# Patient Record
Sex: MALE | Race: WHITE | Employment: FULL TIME | URBAN - METROPOLITAN AREA
[De-identification: names, ages, dates, MRNs, and addresses within clinical notes are randomized per-mention and may not be internally consistent; named-entity substitution may affect disease eponyms.]

---

## 2017-06-16 ENCOUNTER — TELEPHONE (OUTPATIENT)
Dept: CARDIOLOGY CLINIC | Age: 62
End: 2017-06-16

## 2017-06-16 NOTE — TELEPHONE ENCOUNTER
[facility] from [facility] for [facility] biopsy on 7/12/17. Last seen 3/2016 and did not show for last appointment. Needs appointment with Dr Jamie Thakur. Verified patient with two types of identifiers. Made appt to see Dr Jamie Thakur for clearance. Patient verbalizes understanding. And will call with any questions or concerns.

## 2017-07-06 ENCOUNTER — OFFICE VISIT (OUTPATIENT)
Dept: CARDIOLOGY CLINIC | Age: 62
End: 2017-07-06

## 2017-07-06 VITALS
DIASTOLIC BLOOD PRESSURE: 70 MMHG | HEART RATE: 60 BPM | SYSTOLIC BLOOD PRESSURE: 96 MMHG | HEIGHT: 77 IN | WEIGHT: 217 LBS | BODY MASS INDEX: 25.62 KG/M2

## 2017-07-06 DIAGNOSIS — I48.92 PAROXYSMAL ATRIAL FLUTTER (HCC): ICD-10-CM

## 2017-07-06 DIAGNOSIS — Z01.810 PREOP CARDIOVASCULAR EXAM: Primary | ICD-10-CM

## 2017-07-06 DIAGNOSIS — I48.19 PERSISTENT ATRIAL FIBRILLATION (HCC): ICD-10-CM

## 2017-07-06 NOTE — PROGRESS NOTES
Cardiac Electrophysiology Office Note     Subjective:      Lupe Baxter is a 64 y.o.  male patient whom I had seen at [address] [place] [address] and cardioverted his atrial fibrillation in 10/2011. At the time he had small bowel surgery. In the past he has sinus bradycardia at rest and with exercise so he does not want to take medications every day   Reports history of stroke related to an air embolism. Was admitted at [address] [place] [address] for bowel obstruction at that time. Had a central line removed and within 2 minutes CP/SOB sx-found to be an air embolism. Had a 13 day hospital stay with this. Had left sided hemiparesis which resolved within a few days. Denies residual effects    He is here today for cardiac clearance prior to his prostate biopsy next week with Jovanni Valdovinos at the [facility] [place]. He says he has been doing well. He denies palpitations, irregular heart beat, SOB, chest pain or LE edema. He only takes metoprolol and flecainide prn (pill in the pocket) and he has not have to take this. He takes aspirin. He was not aware he was in atrial fibrillation today.      Past Medical History:   Diagnosis Date    Atrial fibrillation with rapid ventricular response ([address] 10/15/2011    [facility] by Dr. Mellisa Campbell 2011    DVT (deep venous thrombosis) ([address]     duirng 4/08 around time of bowel surgery;  and again in 2011 after bowel surgery;  coagulopathy workup was OK    Embolism ([address] 4/1/2008    s/p small bowel surgery, at time of catheter removal reduced EF immediately, improved to normal on subsequent echos    PAF (paroxysmal atrial fibrillation) ([address]     present 15 years, had [facility] in 2011 during admission for SBO    Stroke ([address] 4/5/2008    air embolism after central line removal; s/p small bowel surgery    Ulcerative colitis     s/p total colectomy     Past Surgical History:   Procedure Laterality Date    CARDIOVERSION EXTERNAL  10/17/2011         HX COLECTOMY      total colectomy with IPAA for UC    HX LYSIS OF ADHESIONS      HX OTHER SURGICAL      Echo 10/11 - LVEF 55-60%, mild LAE, mild MR    HX SMALL BOWEL RESECTION       Allergies   Allergen Reactions    Cephalosporins Other (comments)     Intrahepatic cholestasis with cephalosporin, bilirubin valentina as high as 6. NOT AN ALLERGY, but rather an adverse reaction. He could likely tolerate a short course if the risk < benefit. Current Outpatient Prescriptions   Medication Sig    flecainide (TAMBOCOR) 100 mg tablet Take 3 tablets as needed 30 minutes after a dose of metoprolol if AFIB does not stop  Indications: PAROXYSMAL ATRIAL FLUTTER    cholecalciferol (VITAMIN D3) 1,000 unit tablet Take 1,000 Units by mouth daily.  multivitamin (ONE A DAY) tablet Take 1 Tab by mouth daily.  B INFANTIS/B ANI/B LUIS FERNANDO/B BIFID (PROBIOTIC 4X PO) Take  by mouth.  aspirin 81 mg tablet Take 162 mg by mouth daily.  metoprolol (LOPRESSOR) 25 mg tablet Take 1 Tab by mouth as needed. Indications: VENTRICULAR RATE CONTROL IN ATRIAL FIBRILLATION     No current facility-administered medications for this visit. SOCIAL  FT employed in sales, lots of travelling  , 2 children  Non-smoker  ETOH- social, 2 times a week, beer  Exercises 5 times a week, 35-45 minutes- cardio and light weights/swim/jog  Caffeine- 2 cups of coffee a day  Eats a heart healthy diet, Mediterranean      Review of Systems:   Constitutional: Negative for fever, chills, weight loss, malaise/fatigue. HEENT: Negative for nosebleeds, vision changes. Respiratory: Negative for cough, hemoptysis, sputum production, and wheezing. Cardiovascular: Negative for chest pain, orthopnea, claudication, leg swelling, syncope, and PND. Gastrointestinal: Negative for nausea, vomiting, diarrhea, constipation, blood in stool and melena. Genitourinary: Negative for dysuria, and hematuria. Musculoskeletal: Negative for myalgias, arthralgia. Skin: Negative for rash.    Heme: Does not bleed or bruise easily. Neurological: Negative for speech change and focal weakness     Objective:     Visit Vitals    BP 96/70 (BP 1 Location: Right arm, BP Patient Position: Sitting)    Pulse 60    Ht 6' 5\" (1.956 m)    Wt 217 lb (98.4 kg)    BMI 25.73 kg/m2      Physical Exam:   Constitutional: well-developed and well-nourished. No distress. Head: Normocephalic and atraumatic. Eyes: Pupils are equal, round  Neck: supple. No JVD present. Cardiovascular: Normal rate, irregular rhythm and normal heart sounds. Exam reveals no gallop and no friction rub. No murmur heard. Pulmonary/Chest: Effort normal and breath sounds normal. No wheezes. Abdominal: Soft, no tenderness. Musculoskeletal: no edema. Neurological: alert,oriented. Skin: Skin is warm and dry  Psychiatric: normal mood and affect. Behavior is normal. Judgment and thought content normal.      ECG atrial fibrillation ventricular rate 73 bpm        Assessment/Plan:     Encounter Diagnoses   Name Primary?  Paroxysmal atrial flutter (HCC) Yes    Preoperative clearance      Mr. Guero Bustamante is doing well, he has no symptoms of atrial fibrillation although he is in atrial fibrillation today. ECG shows atrial fibrillation with controlled ventricular rate. [id number], he takes an ASA. He may hold the aspirin 7 days prior to the procedure and resume it 2 days following the procedure. Discussed that he will need anticoagulation in the future embolic CVA prevention (after age 73 yo). No further cardiac testing needed prior to procedure. He will follow up in 6 months and we will repeat the echo at this time, last echo 2011. Since he is unaware that he is atrial fibrillation now, recommend discontinuing pill in the pocket. BP slightly low, he is asymptomatic. Follow-up Disposition: Not on File    Thank you for involving me in this patient's care and please call with further concerns or questions.

## 2017-07-06 NOTE — PROGRESS NOTES
Chief Complaint   Patient presents with    Surgical Clearance    Irregular Heart Beat     a-fib     Verified patient with two types of identifiers. Verified medications with the patient. Verified pharmacy with patient. Faxed clearance form to [facility] at fax number [phone]. Fax confirmation received.

## 2017-07-06 NOTE — PROGRESS NOTES
Cardiac Electrophysiology Office Note     Subjective:      Nette Osorio is a 64 y.o.  male patient whom I had seen at [address] [place] [address] and cardioverted his atrial fibrillation in 10/2011. At the time he had small bowel surgery. In the past he has sinus bradycardia at rest and with exercise so he does not want to take medications every day   Reported history of stroke related to an air embolism. Was admitted at [address] [place] [address] for bowel obstruction at that time. Had a central line removed and within 2 minutes CP/SOB sx-found to be an air embolism. Had a 13 day hospital stay with this. Had left sided hemiparesis which resolved within a few days. Denied neuro residual deficits    He is here today for cardiac clearance prior to his prostate biopsy next week with Dr Harinder Fletcher at the [address] [place] [address] [facility]. He says he has been doing well. He denies palpitations, irregular heart beat, SOB, chest pain or LE edema. He only takes metoprolol and flecainide prn (pill in the pocket) and he has not have to take this. He takes aspirin. He was not aware he is in atrial fibrillation today.      Past Medical History:   Diagnosis Date    Atrial fibrillation with rapid ventricular response ([address] 10/15/2011    [facility] by Dr. Ubaldo Schirmer 2011    DVT (deep venous thrombosis) ([address]     duirng 4/08 around time of bowel surgery;  and again in 2011 after bowel surgery;  coagulopathy workup was OK    Embolism ([address] 4/1/2008    s/p small bowel surgery, at time of catheter removal reduced EF immediately, improved to normal on subsequent echos    PAF (paroxysmal atrial fibrillation) ([address]     present 15 years, had [facility] in 2011 during admission for SBO    Stroke ([address] 4/5/2008    air embolism after central line removal; s/p small bowel surgery    Ulcerative colitis     s/p total colectomy     Past Surgical History:   Procedure Laterality Date    CARDIOVERSION EXTERNAL  10/17/2011         HX COLECTOMY      total colectomy with IPAA for UC    HX LYSIS OF ADHESIONS      HX OTHER SURGICAL      Echo 10/11 - LVEF 55-60%, mild LAE, mild MR    HX SMALL BOWEL RESECTION       Allergies   Allergen Reactions    Cephalosporins Other (comments)     Intrahepatic cholestasis with cephalosporin, bilirubin valentina as high as 6. NOT AN ALLERGY, but rather an adverse reaction. He could likely tolerate a short course if the risk < benefit. Current Outpatient Prescriptions   Medication Sig    cholecalciferol (VITAMIN D3) 1,000 unit tablet Take 1,000 Units by mouth daily.  multivitamin (ONE A DAY) tablet Take 1 Tab by mouth daily.  B INFANTIS/B ANI/B LUIS FERNANDO/B BIFID (PROBIOTIC 4X PO) Take  by mouth.  aspirin 81 mg tablet Take 162 mg by mouth daily. No current facility-administered medications for this visit. SOCIAL  FT employed in sales, lots of travelling  , 2 children  Non-smoker  ETOH- social, 2 times a week, beer  Exercises 5 times a week, 35-45 minutes- cardio and light weights/swim/jog  Caffeine- 2 cups of coffee a day  Eats a heart healthy diet, Mediterranean      Review of Systems:   Constitutional: Negative for fever, chills, weight loss, malaise/fatigue. HEENT: Negative for nosebleeds, vision changes. Respiratory: Negative for cough, hemoptysis, sputum production, and wheezing. Cardiovascular: Negative for chest pain, orthopnea, claudication, leg swelling, syncope, and PND. Gastrointestinal: Negative for nausea, vomiting, diarrhea, constipation, blood in stool and melena. Genitourinary: Negative for dysuria, and hematuria. Musculoskeletal: Negative for myalgias, arthralgia. Skin: Negative for rash. Heme: Does not bleed or bruise easily.    Neurological: Negative for speech change and focal weakness     Objective:     Visit Vitals    BP 96/70 (BP 1 Location: Right arm, BP Patient Position: Sitting)    Pulse 60    Ht 6' 5\" (1.956 m)    Wt 217 lb (98.4 kg)    BMI 25.73 kg/m2      Physical Exam: Constitutional: well-developed and well-nourished. No distress. Head: Normocephalic and atraumatic. Eyes: Pupils are equal, round  Neck: supple. No JVD present. Cardiovascular: Normal rate, irregular rhythm and normal heart sounds. Exam reveals no gallop and no friction rub. No murmur heard. Pulmonary/Chest: Effort normal and breath sounds normal. No wheezes. Abdominal: Soft, no tenderness. Musculoskeletal: no edema. Neurological: alert,oriented. Skin: Skin is warm and dry  Psychiatric: normal mood and affect. Behavior is normal. Judgment and thought content normal.      ECG atrial fibrillation ventricular rate controlled       Assessment/Plan:     Encounter Diagnoses   Name Primary?  Preop cardiovascular exam Yes    Paroxysmal atrial flutter (HCC)     Persistent atrial fibrillation [facility])      Mr. Ashley Lorenzana reported that he is doing well, he has no symptoms of atrial fibrillation although he is in atrial fibrillation today. ECG shows atrial fibrillation with controlled ventricular rate. Therefore he has been in persistent atrial fibrillation likely. We do not know the time and duration of his current AFIB episode. [id number], he takes an ASA. He may hold the aspirin 7 days prior to the procedure and resume it 2 days following the procedure. Discussed that he will need anticoagulation in the future embolic CVA prevention (after age 73 yo or older or when there is more risk of stroke which requires another echocardiogram to redetermine LVEF). However this is not required for his upcoming urologic procedure (low risk for cardiac event procedure) and hence I would not delay it to get an echocardiogram  He will follow up in 6 months and we will repeat the echo at this time, last echo 2011 and at that time LVEF was normal  He has no known CAD or angina.    Since he is unaware that he is atrial fibrillation and is not taking [address], I recommend him discontinue pill in the pocket flecainide metoprolol. His ventricular rate is normal even when he is not taking metoprolol  BP slightly low, he does not feel dizzy so this does not require treatment. Follow-up Disposition:  Return in about 6 months (around 1/6/2018). Thank you for involving me in this patient's care and please call with further concerns or questions.

## 2017-07-06 NOTE — MR AVS SNAPSHOT
Visit Information Date & Time Provider Department Dept. Phone Encounter #  
 7/6/2017  8:00 AM Be Rose MD [facility] Hoang [phone] [id number] Your Appointments 1/11/2018  8:00 AM  
ECHO CARDIOGRAMS 2D with Milton Neskowin [facility] ([facility] SCHEDULING) Appt Note: Per Dr. Zaki Tomas Echo dx afib, See Zaki Tomas after  
 [address] [place]  
[address] [place] [facility]  
  
    
 1/11/2018  8:40 AM  
ESTABLISHED PATIENT with Be Rose MD  
[facility] ([address]) Appt Note: Per Dr. Zaki Tomas Echo dx afib, See Zaki Tomas after  
 [address] [place] [address]  
[address] [address] Upcoming Health Maintenance Date Due DTaP/Tdap/Td series (1 - Tdap) 12/20/1976 FOBT Q 1 YEAR AGE 50-75 12/20/2005 ZOSTER VACCINE AGE 60> 12/20/2015 INFLUENZA AGE 9 TO ADULT 8/1/2017 Allergies as of 7/6/2017  Review Complete On: 7/6/2017 By: Be Rose MD  
  
 Severity Noted Reaction Type Reactions Cephalosporins Medium 10/20/2011   Systemic Other (comments) Intrahepatic cholestasis with cephalosporin, bilirubin valentina as high as 6. NOT AN ALLERGY, but rather an adverse reaction. He could likely tolerate a short course if the risk < benefit. Current Immunizations  Never Reviewed No immunizations on file. Not reviewed this visit You Were Diagnosed With   
  
 Codes Comments Preoperative clearance    -  Primary ICD-10-CM: L79.165 ICD-9-CM: V72.84 Paroxysmal atrial flutter (HCC)     ICD-10-CM: I48.92 
ICD-9-CM: 427.32 Vitals BP Pulse Height(growth percentile) Weight(growth percentile) BMI Smoking Status 96/70 (BP 1 Location: Right arm, BP Patient Position: Sitting) 60 6' 5\" (1.956 m) 217 lb (98.4 kg) 25.73 kg/m2 Never Smoker Vitals History BMI and BSA Data Body Mass Index Body Surface Area 25.73 kg/m 2 2.31 m 2 Preferred Pharmacy Pharmacy Name Phone [facility] [address] [place] [phone] Your Updated Medication List  
  
   
This list is accurate as of: 7/6/17  8:30 AM.  Always use your most recent med list.  
  
  
  
  
 aspirin 81 mg tablet Take 162 mg by mouth daily. multivitamin tablet Commonly known as:  ONE A DAY Take 1 Tab by mouth daily. PROBIOTIC 4X PO Take  by mouth. VITAMIN D3 1,000 unit tablet Generic drug:  cholecalciferol Take 1,000 Units by mouth daily. We Performed the Following AMB POC EKG ROUTINE W/ 12 LEADS, INTER & REP [13344 CPT(R)] Patient Instructions You will need to follow up in clinic with Dr. Keerthi Calix in 6 months. Introducing [facility] SERVICES! Tiffanie Bañuelos introduces [employer] patient portal. Now you can access parts of your medical record, email your doctor's office, and request medication refills online. 1. In your internet browser, go to [email or web address] 2. Click on the First Time User? Click Here link in the Sign In box. You will see the New Member Sign Up page. 3. Enter your [employer] Access Code exactly as it appears below. You will not need to use this code after youve completed the sign-up process. If you do not sign up before the expiration date, you must request a new code. · [employer] Access Code: [email or web address] Expires: 10/4/2017  8:30 AM 
 
4. Enter the last four digits of your Social Security Number (xxxx) and Date of Birth (mm/dd/yyyy) as indicated and click Submit. You will be taken to the next sign-up page. 5. Create a [email or web address] ID. This will be your [employer] login ID and cannot be changed, so think of one that is secure and easy to remember. 6. Create a [email or web address].comt password. You can change your password at any time. 7. Enter your Password Reset Question and Answer. This can be used at a later time if you forget your password. 8. Enter your e-mail address. You will receive e-mail notification when new information is available in [address] 9. Click Sign Up. You can now view and download portions of your medical record. 10. Click the Download Summary menu link to download a portable copy of your medical information. If you have questions, please visit the Frequently Asked Questions section of the [employer] website. Remember, [employer] is NOT to be used for urgent needs. For medical emergencies, dial 911. Now available from your iPhone and Android! Please provide this summary of care documentation to your next provider. Your primary care clinician is listed as Melina Henderson. If you have any questions after today's visit, please call [phone].

## 2018-01-15 ENCOUNTER — TELEPHONE (OUTPATIENT)
Dept: CARDIOLOGY CLINIC | Age: 63
End: 2018-01-15

## 2018-01-15 NOTE — TELEPHONE ENCOUNTER
Patient missed his echo and appointment scheduled with  on 01/11/18, and would like to be seen sooner than next availability in April. Patient can be reached at [phone]. Thanks !

## 2018-03-01 ENCOUNTER — OFFICE VISIT (OUTPATIENT)
Dept: CARDIOLOGY CLINIC | Age: 63
End: 2018-03-01

## 2018-03-01 ENCOUNTER — CLINICAL SUPPORT (OUTPATIENT)
Dept: CARDIOLOGY CLINIC | Age: 63
End: 2018-03-01

## 2018-03-01 VITALS
WEIGHT: 223 LBS | BODY MASS INDEX: 26.33 KG/M2 | HEART RATE: 88 BPM | SYSTOLIC BLOOD PRESSURE: 126 MMHG | DIASTOLIC BLOOD PRESSURE: 82 MMHG | HEIGHT: 77 IN

## 2018-03-01 DIAGNOSIS — I51.7 LAE (LEFT ATRIAL ENLARGEMENT): ICD-10-CM

## 2018-03-01 DIAGNOSIS — I48.92 PAROXYSMAL ATRIAL FLUTTER (HCC): Primary | ICD-10-CM

## 2018-03-01 DIAGNOSIS — R00.2 PALPITATIONS: ICD-10-CM

## 2018-03-01 DIAGNOSIS — I48.19 PERSISTENT ATRIAL FIBRILLATION (HCC): ICD-10-CM

## 2018-03-01 DIAGNOSIS — I48.91 ATRIAL FIBRILLATION, UNSPECIFIED TYPE (HCC): Primary | ICD-10-CM

## 2018-03-01 DIAGNOSIS — I07.1 TRICUSPID VALVE INSUFFICIENCY, UNSPECIFIED ETIOLOGY: ICD-10-CM

## 2018-03-01 DIAGNOSIS — I34.0 MITRAL VALVE INSUFFICIENCY, UNSPECIFIED ETIOLOGY: ICD-10-CM

## 2018-03-01 NOTE — MR AVS SNAPSHOT
[address] [facility] [address] [facility] 
[phone] Patient: Ashley Orourke MRN: [id number] JPP:[id number] Visit Information Date & Time Provider Department Dept. Phone Encounter #  
 3/1/2018  3:40 PM Carlos Leung MD [facility] Erinn Sullivan [phone] [id number] Follow-up Instructions Return in about 6 months (around 9/1/2018). Upcoming Health Maintenance Date Due DTaP/Tdap/Td series (1 - Tdap) 12/20/1976 FOBT Q 1 YEAR AGE 50-75 12/20/2005 ZOSTER VACCINE AGE 60> 10/20/2015 Influenza Age 5 to Adult 8/1/2017 Allergies as of 3/1/2018  Review Complete On: 3/1/2018 By: Carlos Leung MD  
  
 Severity Noted Reaction Type Reactions Cephalosporins Medium 10/20/2011   Systemic Other (comments) Intrahepatic cholestasis with cephalosporin, bilirubin valentina as high as 6. NOT AN ALLERGY, but rather an adverse reaction. He could likely tolerate a short course if the risk < benefit. Current Immunizations  Never Reviewed No immunizations on file. Not reviewed this visit You Were Diagnosed With   
  
 Codes Comments Paroxysmal atrial flutter (HCC)    -  Primary ICD-10-CM: I48.92 
ICD-9-CM: 427.32 Palpitations     ICD-10-CM: R00.2 ICD-9-CM: 785.1 Persistent atrial fibrillation (HCC)     ICD-10-CM: I48.1 ICD-9-CM: 427.31 Vitals BP Pulse Height(growth percentile) Weight(growth percentile) BMI Smoking Status 126/82 88 6' 5\" (1.956 m) 223 lb (101.2 kg) 26.44 kg/m2 Never Smoker Vitals History BMI and BSA Data Body Mass Index Body Surface Area  
 26.44 kg/m 2 2.34 m 2 Preferred Pharmacy Pharmacy Name Phone [facility] [address] [place] Daniele [place]luis [phone] Your Updated Medication List  
  
   
This list is accurate as of 3/1/18  4:23 PM.  Always use your most recent med list.  
  
  
  
  
 aspirin 81 mg tablet Take 162 mg by mouth daily. multivitamin tablet Commonly known as:  ONE A DAY Take 1 Tab by mouth daily. PROBIOTIC 4X PO Take  by mouth. Follow-up Instructions Return in about 6 months (around 9/1/2018). Patient Instructions You will need to follow up in clinic with Dr. Carolin Minor in 12 months. Introducing [facility]! Sofía Malone introduces [employer] patient portal. Now you can access parts of your medical record, email your doctor's office, and request medication refills online. 1. In your internet browser, go to [email or web address] 2. Click on the First Time User? Click Here link in the Sign In box. You will see the New Member Sign Up page. 3. Enter your [employer] Access Code exactly as it appears below. You will not need to use this code after youve completed the sign-up process. If you do not sign up before the expiration date, you must request a new code. · [employer] Access Code: [email or web address] Expires: 5/30/2018  4:23 PM 
 
4. Enter the last four digits of your Social Security Number (xxxx) and Date of Birth (mm/dd/yyyy) as indicated and click Submit. You will be taken to the next sign-up page. 5. Create a [employer] ID. This will be your [employer] login ID and cannot be changed, so think of one that is secure and easy to remember. 6. Create a [employer] password. You can change your password at any time. 7. Enter your Password Reset Question and Answer. This can be used at a later time if you forget your password. 8. Enter your e-mail address. You will receive e-mail notification when new information is available in [address] 9. Click Sign Up. You can now view and download portions of your medical record. 10. Click the Download Summary menu link to download a portable copy of your medical information.  
 
If you have questions, please visit the Frequently Asked Questions section of the [employer]. Remember, [employer] is NOT to be used for urgent needs. For medical emergencies, dial 911. Now available from your iPhone and Android! Please provide this summary of care documentation to your next provider. Your primary care clinician is listed as Michelle Ravi. If you have any questions after today's visit, please call [phone].

## 2018-03-01 NOTE — PROGRESS NOTES
Cardiac Electrophysiology Office Note     Subjective:      Celina Ruiz is a 58 y.o.  male patient whom I had seen at [facility] and cardioverted his atrial fibrillation in 10/2011. At the time he had small bowel surgery. In the past he has sinus bradycardia at rest and with exercise so he does not want to take medications every day   Reported history of stroke related to an air embolism. Was admitted at [facility] for bowel obstruction at that time. Had a central line removed and within 2 minutes CP/SOB sx-found to be an air embolism. Had a 13 day hospital stay with this. Had left sided hemiparesis which resolved within a few days. Denied neuro residual deficits    He only takes metoprolol and flecainide prn (pill in the pocket)  He takes aspirin. He thinks that he has been in atrial fibrillation more frequent than he would like to believe. The patient does not have significant fatigue or palpitation symptoms because the rate is controlled.   He had the calcium score done ordered by his primary care Dr. Madhuri Funes and told me that it is 0 score    Past Medical History:   Diagnosis Date    Atrial fibrillation with rapid ventricular response ([address] 10/15/2011    [address] by Dr. Benny Orona 2011    DVT (deep venous thrombosis) ([address]     duirng 4/08 around time of bowel surgery;  and again in 2011 after bowel surgery;  coagulopathy workup was OK    Embolism ([address] 4/1/2008    s/p small bowel surgery, at time of catheter removal reduced EF immediately, improved to normal on subsequent echos    PAF (paroxysmal atrial fibrillation) ([address]     present 15 years, had [address] in 2011 during admission for SBO    Stroke ([address] 4/5/2008    air embolism after central line removal; s/p small bowel surgery    Ulcerative colitis     s/p total colectomy     Past Surgical History:   Procedure Laterality Date    CARDIOVERSION EXTERNAL  10/17/2011         HX COLECTOMY      total colectomy with IPAA for UC    HX LYSIS OF ADHESIONS      HX OTHER SURGICAL      Echo 10/11 - LVEF 55-60%, mild LAE, mild MR    HX SMALL BOWEL RESECTION       Allergies   Allergen Reactions    Cephalosporins Other (comments)     Intrahepatic cholestasis with cephalosporin, bilirubin valentina as high as 6. NOT AN ALLERGY, but rather an adverse reaction. He could likely tolerate a short course if the risk < benefit. Current Outpatient Prescriptions   Medication Sig    multivitamin (ONE A DAY) tablet Take 1 Tab by mouth daily.  B INFANTIS/B ANI/B LUIS FERNANDO/B BIFID (PROBIOTIC 4X PO) Take  by mouth.  aspirin 81 mg tablet Take 162 mg by mouth daily.  cholecalciferol (VITAMIN D3) 1,000 unit tablet Take 1,000 Units by mouth daily. No current facility-administered medications for this visit. SOCIAL  FT employed in sales, lots of travelling  , 2 children  Non-smoker  ETOH- social, 2 times a week, beer  Exercises 5 times a week, 35-45 minutes- cardio and light weights/swim/jog  Caffeine- 2 cups of coffee a day  Eats a heart healthy diet, Mediterranean      Review of Systems:   Constitutional: Negative for fever, chills, weight loss  HEENT: Negative for nosebleeds, vision changes. Respiratory: Negative for cough, hemoptysis, sputum production, and wheezing. Cardiovascular: Negative for chest pain, orthopnea, claudication, leg swelling, syncope, and PND. Gastrointestinal: Negative for nausea, vomiting, diarrhea, constipation, blood in stool and melena. Genitourinary: Negative for dysuria, and hematuria. Musculoskeletal: Negative for myalgias, arthralgia. Skin: Negative for rash. Heme: Does not bleed or bruise easily. Neurological: Negative for speech change and focal weakness     Objective:     Visit Vitals    /82    Pulse 88    Ht 6' 5\" (1.956 m)    Wt 223 lb (101.2 kg)    BMI 26.44 kg/m2      Physical Exam:   Constitutional: well-developed and well-nourished. No distress. Head: Normocephalic and atraumatic.    Eyes: Pupils are equal, round  Neck: supple. No JVD present. Cardiovascular: Normal rate, irregular rhythm and normal heart sounds. Exam reveals no gallop and no friction rub. No murmur heard. Pulmonary/Chest: Effort normal and breath sounds normal. No wheezes. Abdominal: Soft, no tenderness. scar  Musculoskeletal: no edema. Neurological: alert,oriented. Skin: Skin is warm and dry  Psychiatric: normal mood and affect. Behavior is normal. Judgment and thought content normal.         Assessment/Plan:     Encounter Diagnoses   Name Primary?  Paroxysmal atrial flutter (HCC) Yes    Palpitations     Persistent atrial fibrillation [facility])      Mr. Rafael Guevara is in atrial fibrillation   Therefore he has been in persistent atrial fibrillation likely. We do not know the time and duration of his current AFIB episode. [id number], he takes an ASA. Discussed that he will need anticoagulation in the future embolic CVA prevention (after age 71 yo or older or when there is more risk of stroke; he has echocardiogram to redetermine LVEF). last echo 2011 and at that time LVEF was normal  He has no known CAD or angina. His heart rate usually lower than this. The patient thinks that he is leaning toward A. fib ablation. We will reviewed echocardiogram and called him back and he will tell us whether or not this is something that he wants do because he has more AFIB and does not want to take medication on a regular basis and pill in the pocket is not that all effective for him at this point as he is in persistent atrial fibrillation        Thank you for involving me in this patient's care and please call with further concerns or questions. Shwetha Gibson M.D.  [facility]  Electrophysiology/Cardiology  [facility] [place]  [address] , [address]  [address]  [phone]

## 2018-03-01 NOTE — PROGRESS NOTES
Chief Complaint   Patient presents with    Irregular Heart Beat     a-fib    Follow-up     Verified patient with two types of identifiers. Verified medications with the patient. Verified patient's pharmacy     Per Dr Dorothea Hull discontinued all medications not taken.

## 2018-03-02 NOTE — PROGRESS NOTES
Echo with normal LVEF  Mitral valve regurgitation is mild  LA size enlargement is 5 cm, increased with AFIB  He said he will decide on AFIB ablation based on echo result

## 2018-03-07 ENCOUNTER — TELEPHONE (OUTPATIENT)
Dept: CARDIOLOGY CLINIC | Age: 63
End: 2018-03-07

## 2018-03-07 DIAGNOSIS — I48.0 PAROXYSMAL ATRIAL FIBRILLATION (HCC): ICD-10-CM

## 2018-03-07 DIAGNOSIS — I48.91 ATRIAL FIBRILLATION WITH RAPID VENTRICULAR RESPONSE (HCC): ICD-10-CM

## 2018-03-07 DIAGNOSIS — Z01.812 PRE-PROCEDURE LAB EXAM: Primary | ICD-10-CM

## 2018-03-07 DIAGNOSIS — I48.92 PAROXYSMAL ATRIAL FLUTTER (HCC): ICD-10-CM

## 2018-03-07 NOTE — LETTER
3/7/2018 2:59 PM 
 
Mr. Fito Hayden [address] [place] [id number] Your EP Study and A.fib ablation procedure has been scheduled for 4/6/18 at 10:45 am, at [facility]. 
 
Please report to Admitting Department by 8:45 am, or 2 hours prior to your scheduled procedure. Please bring a list of your current medications and medication bottles, if able, to the hospital on this day. You will be unable to drive after your procedure so please make sure to bring someone with you to your procedure. You will need to have nothing to eat or drink after midnight, the night prior to your procedure. You may have small sips of water, if needed, to take with your medication. You will need labs drawn prior to your procedure. Please go to [facility] to have this done as soon as you are able. You should not stop your medication. After your procedure, you will need to follow up with Dr. Marilyn Tellez.  Your follow-up appointment has been scheduled for 4/19/18 at 9:20 am.  
 
 
Sincerely, 
 
 
Anne-Marie Herman MD

## 2018-03-07 NOTE — TELEPHONE ENCOUNTER
Verified patient with two types of identifiers. Notified patient of echo results. Patient has decided to go ahead with the A fib ablation. Patient scheduled for 4/4/18 at 10:45 am. VO per Dr. Antonella Camilo order CBC, BMP, and CT for pulmonary vein mapping. Pre-procedure instructions reviewed with patient. Will mail lab slips and pre-procedure instructions to patient. Address verified with patient. Patient verbalized understanding and will call with any other questions.

## 2018-03-16 ENCOUNTER — HOSPITAL ENCOUNTER (OUTPATIENT)
Dept: CT IMAGING | Age: 63
Discharge: HOME | End: 2018-03-16
Attending: INTERNAL MEDICINE
Payer: COMMERCIAL

## 2018-03-16 DIAGNOSIS — Z01.812 PRE-PROCEDURE LAB EXAM: ICD-10-CM

## 2018-03-16 DIAGNOSIS — I48.91 ATRIAL FIBRILLATION WITH RAPID VENTRICULAR RESPONSE (HCC): ICD-10-CM

## 2018-03-16 DIAGNOSIS — I48.0 PAROXYSMAL ATRIAL FIBRILLATION (HCC): ICD-10-CM

## 2018-03-16 DIAGNOSIS — I48.92 PAROXYSMAL ATRIAL FLUTTER (HCC): ICD-10-CM

## 2018-03-16 PROCEDURE — 74011636320 HC RX REV CODE- 636/320: Performed by: RADIOLOGY

## 2018-03-16 PROCEDURE — 71275 CT ANGIOGRAPHY CHEST: CPT

## 2018-03-16 RX ADMIN — IOPAMIDOL 100 ML: 755 INJECTION, SOLUTION INTRAVENOUS at 07:16

## 2018-03-18 NOTE — PROGRESS NOTES
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

## 2018-03-18 NOTE — PROGRESS NOTES
Chest CT: Normal PV anatomy except for Accessory segmental pulmonary vein arising from the right superior  pulmonary vein ostium

## 2018-03-28 RX ORDER — SODIUM CHLORIDE 0.9 % (FLUSH) 0.9 %
5-10 SYRINGE (ML) INJECTION AS NEEDED
Status: CANCELLED | OUTPATIENT
Start: 2018-03-28

## 2018-03-28 RX ORDER — SODIUM CHLORIDE 0.9 % (FLUSH) 0.9 %
5-10 SYRINGE (ML) INJECTION EVERY 8 HOURS
Status: CANCELLED | OUTPATIENT
Start: 2018-03-28

## 2018-04-02 ENCOUNTER — TELEPHONE (OUTPATIENT)
Dept: CARDIOLOGY CLINIC | Age: 63
End: 2018-04-02

## 2018-04-02 NOTE — TELEPHONE ENCOUNTER
Pt calling in regards to his procedure that's on Friday. He misplaced his instructions so he does not have the arrival time. Please give him a call back @ [phone]. Thanks!   Wally Huntley

## 2018-04-02 NOTE — TELEPHONE ENCOUNTER
Verified patient with two types of identifiers. Advised him of time and prep prior to procedure. Patient verbalizes understanding. And will call with any questions or concerns.

## 2018-04-06 ENCOUNTER — ANESTHESIA EVENT (OUTPATIENT)
Dept: CARDIAC CATH/INVASIVE PROCEDURES | Age: 63
End: 2018-04-06
Payer: COMMERCIAL

## 2018-04-06 ENCOUNTER — ANESTHESIA (OUTPATIENT)
Dept: CARDIAC CATH/INVASIVE PROCEDURES | Age: 63
End: 2018-04-06
Payer: COMMERCIAL

## 2018-04-06 ENCOUNTER — HOSPITAL ENCOUNTER (OUTPATIENT)
Dept: CARDIAC CATH/INVASIVE PROCEDURES | Age: 63
Setting detail: OBSERVATION
Discharge: HOME | End: 2018-04-07
Attending: INTERNAL MEDICINE | Admitting: INTERNAL MEDICINE
Payer: COMMERCIAL

## 2018-04-06 PROBLEM — Z86.79 S/P ABLATION OF ATRIAL FIBRILLATION: Status: ACTIVE | Noted: 2018-04-06

## 2018-04-06 PROBLEM — Z98.890 S/P ABLATION OF ATRIAL FIBRILLATION: Status: ACTIVE | Noted: 2018-04-06

## 2018-04-06 PROBLEM — I48.19 PERSISTENT ATRIAL FIBRILLATION (HCC): Status: ACTIVE | Noted: 2018-04-06

## 2018-04-06 PROBLEM — Z98.890 STATUS POST CATHETER ABLATION OF ATRIAL FLUTTER: Status: ACTIVE | Noted: 2018-04-06

## 2018-04-06 LAB
ABO + RH BLD: NORMAL
ANION GAP SERPL CALC-SCNC: 7 MMOL/L (ref 5–15)
BLOOD GROUP ANTIBODIES SERPL: NORMAL
BUN SERPL-MCNC: 13 MG/DL (ref 6–20)
BUN/CREAT SERPL: 11 (ref 12–20)
CALCIUM SERPL-MCNC: 8.8 MG/DL (ref 8.5–10.1)
CHLORIDE SERPL-SCNC: 108 MMOL/L (ref 97–108)
CO2 SERPL-SCNC: 28 MMOL/L (ref 21–32)
CREAT SERPL-MCNC: 1.18 MG/DL (ref 0.7–1.3)
ERYTHROCYTE [DISTWIDTH] IN BLOOD BY AUTOMATED COUNT: 13.1 % (ref 11.5–14.5)
GLUCOSE SERPL-MCNC: 94 MG/DL (ref 65–100)
HCT VFR BLD AUTO: 48.7 % (ref 36.6–50.3)
HGB BLD-MCNC: 16.4 G/DL (ref 12.1–17)
MCH RBC QN AUTO: 32.3 PG (ref 26–34)
MCHC RBC AUTO-ENTMCNC: 33.7 G/DL (ref 30–36.5)
MCV RBC AUTO: 95.9 FL (ref 80–99)
NRBC # BLD: 0 K/UL (ref 0–0.01)
NRBC BLD-RTO: 0 PER 100 WBC
PLATELET # BLD AUTO: 188 K/UL (ref 150–400)
PMV BLD AUTO: 10.7 FL (ref 8.9–12.9)
POTASSIUM SERPL-SCNC: 4.5 MMOL/L (ref 3.5–5.1)
RBC # BLD AUTO: 5.08 M/UL (ref 4.1–5.7)
SODIUM SERPL-SCNC: 143 MMOL/L (ref 136–145)
SPECIMEN EXP DATE BLD: NORMAL
WBC # BLD AUTO: 8 K/UL (ref 4.1–11.1)

## 2018-04-06 PROCEDURE — 93662 INTRACARDIAC ECG (ICE): CPT

## 2018-04-06 PROCEDURE — 93656 COMPRE EP EVAL ABLTJ ATR FIB: CPT

## 2018-04-06 PROCEDURE — 77030039046 HC PAD DEFIB RADIOTRNSPNT CNMD -B

## 2018-04-06 PROCEDURE — 85027 COMPLETE CBC AUTOMATED: CPT | Performed by: INTERNAL MEDICINE

## 2018-04-06 PROCEDURE — 80048 BASIC METABOLIC PNL TOTAL CA: CPT | Performed by: INTERNAL MEDICINE

## 2018-04-06 PROCEDURE — C1730 CATH, EP, 19 OR FEW ELECT: HCPCS

## 2018-04-06 PROCEDURE — 74011250636 HC RX REV CODE- 250/636: Performed by: INTERNAL MEDICINE

## 2018-04-06 PROCEDURE — C1894 INTRO/SHEATH, NON-LASER: HCPCS

## 2018-04-06 PROCEDURE — C1893 INTRO/SHEATH, FIXED,NON-PEEL: HCPCS

## 2018-04-06 PROCEDURE — 77030013797 HC KT TRNSDUC PRSSR EDWD -A

## 2018-04-06 PROCEDURE — 77030016898 HC CBL EP EXT3 STJU -B

## 2018-04-06 PROCEDURE — 77030020506 HC NDL TRNSPTL NRG BAYL -F

## 2018-04-06 PROCEDURE — 74011000250 HC RX REV CODE- 250

## 2018-04-06 PROCEDURE — 77030016894 HC CBL EP DX CATH3 STJU -B

## 2018-04-06 PROCEDURE — 77030018733 HC ELECTRD KT ENSITE STJU -G

## 2018-04-06 PROCEDURE — 86900 BLOOD TYPING SEROLOGIC ABO: CPT | Performed by: INTERNAL MEDICINE

## 2018-04-06 PROCEDURE — 77030019908 HC STETH ESOPH SIMS -A: Performed by: ANESTHESIOLOGY

## 2018-04-06 PROCEDURE — 93325 DOPPLER ECHO COLOR FLOW MAPG: CPT

## 2018-04-06 PROCEDURE — C1759 CATH, INTRA ECHOCARDIOGRAPHY: HCPCS

## 2018-04-06 PROCEDURE — 77030026438 HC STYL ET INTUB CARD -A: Performed by: ANESTHESIOLOGY

## 2018-04-06 PROCEDURE — 36415 COLL VENOUS BLD VENIPUNCTURE: CPT | Performed by: INTERNAL MEDICINE

## 2018-04-06 PROCEDURE — 76060000041 HC ANESTHESIA 5 TO 5.5 HR

## 2018-04-06 PROCEDURE — C1731 CATH, EP, 20 OR MORE ELEC: HCPCS

## 2018-04-06 PROCEDURE — 74011250636 HC RX REV CODE- 250/636

## 2018-04-06 PROCEDURE — C1766 INTRO/SHEATH,STRBLE,NON-PEEL: HCPCS

## 2018-04-06 PROCEDURE — 77030008684 HC TU ET CUF COVD -B: Performed by: ANESTHESIOLOGY

## 2018-04-06 PROCEDURE — 77030033352 HC TBNG IRR PMP COOL PNT STJU -B

## 2018-04-06 PROCEDURE — 99218 HC RM OBSERVATION: CPT

## 2018-04-06 PROCEDURE — 77030016899 HC CBL EP EXT4 BSC -B

## 2018-04-06 PROCEDURE — C2630 CATH, EP, COOL-TIP: HCPCS

## 2018-04-06 PROCEDURE — 74011250637 HC RX REV CODE- 250/637: Performed by: INTERNAL MEDICINE

## 2018-04-06 PROCEDURE — 85347 COAGULATION TIME ACTIVATED: CPT

## 2018-04-06 RX ORDER — MIDAZOLAM HYDROCHLORIDE 1 MG/ML
.5-1 INJECTION, SOLUTION INTRAMUSCULAR; INTRAVENOUS
Status: DISCONTINUED | OUTPATIENT
Start: 2018-04-06 | End: 2018-04-06

## 2018-04-06 RX ORDER — NALOXONE HYDROCHLORIDE 0.4 MG/ML
0.4 INJECTION, SOLUTION INTRAMUSCULAR; INTRAVENOUS; SUBCUTANEOUS AS NEEDED
Status: DISCONTINUED | OUTPATIENT
Start: 2018-04-06 | End: 2018-04-07 | Stop reason: HOSPADM

## 2018-04-06 RX ORDER — SODIUM CHLORIDE 0.9 % (FLUSH) 0.9 %
5-10 SYRINGE (ML) INJECTION EVERY 8 HOURS
Status: DISCONTINUED | OUTPATIENT
Start: 2018-04-06 | End: 2018-04-06

## 2018-04-06 RX ORDER — NEOSTIGMINE METHYLSULFATE 1 MG/ML
INJECTION INTRAVENOUS AS NEEDED
Status: DISCONTINUED | OUTPATIENT
Start: 2018-04-06 | End: 2018-04-06 | Stop reason: HOSPADM

## 2018-04-06 RX ORDER — SODIUM CHLORIDE 0.9 % (FLUSH) 0.9 %
5-10 SYRINGE (ML) INJECTION AS NEEDED
Status: DISCONTINUED | OUTPATIENT
Start: 2018-04-06 | End: 2018-04-06

## 2018-04-06 RX ORDER — SODIUM CHLORIDE 0.9 % (FLUSH) 0.9 %
5-10 SYRINGE (ML) INJECTION EVERY 8 HOURS
Status: DISCONTINUED | OUTPATIENT
Start: 2018-04-06 | End: 2018-04-07 | Stop reason: HOSPADM

## 2018-04-06 RX ORDER — PROTAMINE SULFATE 10 MG/ML
1-60 INJECTION, SOLUTION INTRAVENOUS ONCE
Status: COMPLETED | OUTPATIENT
Start: 2018-04-06 | End: 2018-04-06

## 2018-04-06 RX ORDER — ZOLPIDEM TARTRATE 5 MG/1
5 TABLET ORAL
Status: DISCONTINUED | OUTPATIENT
Start: 2018-04-06 | End: 2018-04-07 | Stop reason: HOSPADM

## 2018-04-06 RX ORDER — DIPHENHYDRAMINE HYDROCHLORIDE 50 MG/ML
12.5 INJECTION, SOLUTION INTRAMUSCULAR; INTRAVENOUS
Status: DISCONTINUED | OUTPATIENT
Start: 2018-04-06 | End: 2018-04-07 | Stop reason: HOSPADM

## 2018-04-06 RX ORDER — SODIUM CHLORIDE 9 MG/ML
INJECTION, SOLUTION INTRAVENOUS
Status: DISCONTINUED | OUTPATIENT
Start: 2018-04-06 | End: 2018-04-06 | Stop reason: HOSPADM

## 2018-04-06 RX ORDER — ONDANSETRON 2 MG/ML
INJECTION INTRAMUSCULAR; INTRAVENOUS AS NEEDED
Status: DISCONTINUED | OUTPATIENT
Start: 2018-04-06 | End: 2018-04-06 | Stop reason: HOSPADM

## 2018-04-06 RX ORDER — ATROPINE SULFATE 0.1 MG/ML
0.5 INJECTION INTRAVENOUS AS NEEDED
Status: DISCONTINUED | OUTPATIENT
Start: 2018-04-06 | End: 2018-04-06

## 2018-04-06 RX ORDER — HEPARIN SODIUM 10000 [USP'U]/100ML
18-36 INJECTION, SOLUTION INTRAVENOUS CONTINUOUS
Status: DISCONTINUED | OUTPATIENT
Start: 2018-04-06 | End: 2018-04-06

## 2018-04-06 RX ORDER — SODIUM CHLORIDE, SODIUM LACTATE, POTASSIUM CHLORIDE, CALCIUM CHLORIDE 600; 310; 30; 20 MG/100ML; MG/100ML; MG/100ML; MG/100ML
INJECTION, SOLUTION INTRAVENOUS
Status: DISCONTINUED | OUTPATIENT
Start: 2018-04-06 | End: 2018-04-06 | Stop reason: HOSPADM

## 2018-04-06 RX ORDER — DEXAMETHASONE SODIUM PHOSPHATE 4 MG/ML
INJECTION, SOLUTION INTRA-ARTICULAR; INTRALESIONAL; INTRAMUSCULAR; INTRAVENOUS; SOFT TISSUE AS NEEDED
Status: DISCONTINUED | OUTPATIENT
Start: 2018-04-06 | End: 2018-04-06 | Stop reason: HOSPADM

## 2018-04-06 RX ORDER — SUCCINYLCHOLINE CHLORIDE 20 MG/ML
INJECTION INTRAMUSCULAR; INTRAVENOUS AS NEEDED
Status: DISCONTINUED | OUTPATIENT
Start: 2018-04-06 | End: 2018-04-06 | Stop reason: HOSPADM

## 2018-04-06 RX ORDER — ADENOSINE 3 MG/ML
6 INJECTION, SOLUTION INTRAVENOUS ONCE
Status: DISCONTINUED | OUTPATIENT
Start: 2018-04-06 | End: 2018-04-06

## 2018-04-06 RX ORDER — GLYCOPYRROLATE 0.2 MG/ML
INJECTION INTRAMUSCULAR; INTRAVENOUS AS NEEDED
Status: DISCONTINUED | OUTPATIENT
Start: 2018-04-06 | End: 2018-04-06 | Stop reason: HOSPADM

## 2018-04-06 RX ORDER — HYDROCODONE BITARTRATE AND ACETAMINOPHEN 5; 325 MG/1; MG/1
1 TABLET ORAL
Status: DISCONTINUED | OUTPATIENT
Start: 2018-04-06 | End: 2018-04-07 | Stop reason: HOSPADM

## 2018-04-06 RX ORDER — SODIUM CHLORIDE 0.9 % (FLUSH) 0.9 %
5 SYRINGE (ML) INJECTION AS NEEDED
Status: DISCONTINUED | OUTPATIENT
Start: 2018-04-06 | End: 2018-04-06

## 2018-04-06 RX ORDER — PROPOFOL 10 MG/ML
INJECTION, EMULSION INTRAVENOUS AS NEEDED
Status: DISCONTINUED | OUTPATIENT
Start: 2018-04-06 | End: 2018-04-06 | Stop reason: HOSPADM

## 2018-04-06 RX ORDER — HEPARIN SODIUM 1000 [USP'U]/ML
5000 INJECTION, SOLUTION INTRAVENOUS; SUBCUTANEOUS
Status: DISCONTINUED | OUTPATIENT
Start: 2018-04-06 | End: 2018-04-06

## 2018-04-06 RX ORDER — PHENYLEPHRINE HCL IN 0.9% NACL 0.4MG/10ML
SYRINGE (ML) INTRAVENOUS AS NEEDED
Status: DISCONTINUED | OUTPATIENT
Start: 2018-04-06 | End: 2018-04-06 | Stop reason: HOSPADM

## 2018-04-06 RX ORDER — HEPARIN SODIUM 1000 [USP'U]/ML
INJECTION, SOLUTION INTRAVENOUS; SUBCUTANEOUS AS NEEDED
Status: DISCONTINUED | OUTPATIENT
Start: 2018-04-06 | End: 2018-04-06

## 2018-04-06 RX ORDER — MIDAZOLAM HYDROCHLORIDE 1 MG/ML
INJECTION, SOLUTION INTRAMUSCULAR; INTRAVENOUS AS NEEDED
Status: DISCONTINUED | OUTPATIENT
Start: 2018-04-06 | End: 2018-04-06 | Stop reason: HOSPADM

## 2018-04-06 RX ORDER — ROCURONIUM BROMIDE 10 MG/ML
INJECTION, SOLUTION INTRAVENOUS AS NEEDED
Status: DISCONTINUED | OUTPATIENT
Start: 2018-04-06 | End: 2018-04-06 | Stop reason: HOSPADM

## 2018-04-06 RX ORDER — SODIUM CHLORIDE 0.9 % (FLUSH) 0.9 %
5-10 SYRINGE (ML) INJECTION AS NEEDED
Status: DISCONTINUED | OUTPATIENT
Start: 2018-04-06 | End: 2018-04-07 | Stop reason: HOSPADM

## 2018-04-06 RX ORDER — FENTANYL CITRATE 50 UG/ML
INJECTION, SOLUTION INTRAMUSCULAR; INTRAVENOUS AS NEEDED
Status: DISCONTINUED | OUTPATIENT
Start: 2018-04-06 | End: 2018-04-06 | Stop reason: HOSPADM

## 2018-04-06 RX ORDER — ACETAMINOPHEN 325 MG/1
650 TABLET ORAL
Status: DISCONTINUED | OUTPATIENT
Start: 2018-04-06 | End: 2018-04-07 | Stop reason: HOSPADM

## 2018-04-06 RX ORDER — HEPARIN SODIUM 200 [USP'U]/100ML
500 INJECTION, SOLUTION INTRAVENOUS ONCE
Status: DISCONTINUED | OUTPATIENT
Start: 2018-04-06 | End: 2018-04-06

## 2018-04-06 RX ORDER — FENTANYL CITRATE 50 UG/ML
25-200 INJECTION, SOLUTION INTRAMUSCULAR; INTRAVENOUS
Status: DISCONTINUED | OUTPATIENT
Start: 2018-04-06 | End: 2018-04-06

## 2018-04-06 RX ORDER — HEPARIN SODIUM 200 [USP'U]/100ML
1000 INJECTION, SOLUTION INTRAVENOUS CONTINUOUS
Status: DISCONTINUED | OUTPATIENT
Start: 2018-04-06 | End: 2018-04-06

## 2018-04-06 RX ADMIN — DEXAMETHASONE SODIUM PHOSPHATE 8 MG: 4 INJECTION, SOLUTION INTRA-ARTICULAR; INTRALESIONAL; INTRAMUSCULAR; INTRAVENOUS; SOFT TISSUE at 13:15

## 2018-04-06 RX ADMIN — SUCCINYLCHOLINE CHLORIDE 160 MG: 20 INJECTION INTRAMUSCULAR; INTRAVENOUS at 12:32

## 2018-04-06 RX ADMIN — SODIUM CHLORIDE: 9 INJECTION, SOLUTION INTRAVENOUS at 10:15

## 2018-04-06 RX ADMIN — Medication 10 ML: at 21:22

## 2018-04-06 RX ADMIN — Medication 40 MCG: at 16:14

## 2018-04-06 RX ADMIN — MIDAZOLAM HYDROCHLORIDE 2 MG: 1 INJECTION, SOLUTION INTRAMUSCULAR; INTRAVENOUS at 10:30

## 2018-04-06 RX ADMIN — HEPARIN SODIUM 5000 UNITS: 1000 INJECTION, SOLUTION INTRAVENOUS; SUBCUTANEOUS at 14:21

## 2018-04-06 RX ADMIN — HEPARIN SODIUM 17.9 UNITS/HR: 10000 INJECTION, SOLUTION INTRAVENOUS at 13:53

## 2018-04-06 RX ADMIN — ACETAMINOPHEN 650 MG: 325 TABLET, FILM COATED ORAL at 23:55

## 2018-04-06 RX ADMIN — ZOLPIDEM TARTRATE 5 MG: 5 TABLET ORAL at 22:34

## 2018-04-06 RX ADMIN — ROCURONIUM BROMIDE 30 MG: 10 INJECTION, SOLUTION INTRAVENOUS at 14:19

## 2018-04-06 RX ADMIN — PROTAMINE SULFATE 50 MG: 10 INJECTION, SOLUTION INTRAVENOUS at 16:28

## 2018-04-06 RX ADMIN — SODIUM CHLORIDE, SODIUM LACTATE, POTASSIUM CHLORIDE, CALCIUM CHLORIDE: 600; 310; 30; 20 INJECTION, SOLUTION INTRAVENOUS at 16:54

## 2018-04-06 RX ADMIN — ONDANSETRON 4 MG: 2 INJECTION INTRAMUSCULAR; INTRAVENOUS at 13:15

## 2018-04-06 RX ADMIN — ONDANSETRON 4 MG: 2 INJECTION INTRAMUSCULAR; INTRAVENOUS at 16:53

## 2018-04-06 RX ADMIN — FENTANYL CITRATE 50 MCG: 50 INJECTION, SOLUTION INTRAMUSCULAR; INTRAVENOUS at 10:31

## 2018-04-06 RX ADMIN — NEOSTIGMINE METHYLSULFATE 2 MG: 1 INJECTION INTRAVENOUS at 16:53

## 2018-04-06 RX ADMIN — SODIUM CHLORIDE, SODIUM LACTATE, POTASSIUM CHLORIDE, CALCIUM CHLORIDE: 600; 310; 30; 20 INJECTION, SOLUTION INTRAVENOUS at 12:51

## 2018-04-06 RX ADMIN — HEPARIN SODIUM 5000 UNITS: 1000 INJECTION, SOLUTION INTRAVENOUS; SUBCUTANEOUS at 14:50

## 2018-04-06 RX ADMIN — Medication 40 MCG: at 15:40

## 2018-04-06 RX ADMIN — ROCURONIUM BROMIDE 10 MG: 10 INJECTION, SOLUTION INTRAVENOUS at 14:56

## 2018-04-06 RX ADMIN — FENTANYL CITRATE 50 MCG: 50 INJECTION, SOLUTION INTRAMUSCULAR; INTRAVENOUS at 12:31

## 2018-04-06 RX ADMIN — HEPARIN SODIUM 10000 UNITS: 1000 INJECTION, SOLUTION INTRAVENOUS; SUBCUTANEOUS at 13:50

## 2018-04-06 RX ADMIN — PROPOFOL 50 MG: 10 INJECTION, EMULSION INTRAVENOUS at 13:49

## 2018-04-06 RX ADMIN — ROCURONIUM BROMIDE 10 MG: 10 INJECTION, SOLUTION INTRAVENOUS at 15:22

## 2018-04-06 RX ADMIN — GLYCOPYRROLATE 0.4 MG: 0.2 INJECTION INTRAMUSCULAR; INTRAVENOUS at 16:53

## 2018-04-06 RX ADMIN — PROPOFOL 200 MG: 10 INJECTION, EMULSION INTRAVENOUS at 12:31

## 2018-04-06 RX ADMIN — ROCURONIUM BROMIDE 20 MG: 10 INJECTION, SOLUTION INTRAVENOUS at 12:31

## 2018-04-06 NOTE — PROGRESS NOTES
1718:  TRANSFER - IN REPORT:    Verbal report received from [facility] with Anesthesia on Luciano Sniedr , from the Cardiac Cath lab, for routine progression of care. Report consisted of patients Situation, Background, Assessment and Recommendations(SBAR). Information from the following report(s) Procedure Summary, Intake/Output, MAR and Recent Results was reviewed with the receiving clinician. Opportunity for questions and clarification was provided. Assessment completed upon patients arrival to [address] [place] [address] and care assumed. Cardiac Cath Lab Recovery Arrival Note:     Luciano Snider arrived to [facility] recovery area. Patient procedure= BUD/Afib ablation. Patient on cardiac monitor, non-invasive blood pressure, Patient status doing well without problems. Patient is A&Ox 4. Patient reports no complaints. Procedure site without any bleeding and no hematoma. 1815: A line removed out of right radial artery. Hand held pressure applied for 10 mins. No bleeding and no hematoma. Will continue to monitor.

## 2018-04-06 NOTE — ANESTHESIA POSTPROCEDURE EVALUATION
Post-Anesthesia Evaluation and Assessment    Patient: Patrice Camilo MRN: [id number]  SSN: [id number]    YOB: 1955  Age: 58 y.o. Sex: male       Cardiovascular Function/Vital Signs  Visit Vitals    /50 (BP 1 Location: Left arm, BP Patient Position: At rest)    Pulse 67    Temp 36.4 °C (97.5 °F)    Resp 17    Ht 6' 5\" (1.956 m)    Wt 99.8 kg (220 lb)    SpO2 96%    BMI 26.09 kg/m2       Patient is status post general anesthesia for * No procedures listed *. Nausea/Vomiting: None    Postoperative hydration reviewed and adequate. Pain:  Pain Scale 1: Numeric (0 - 10) (04/06/18 1728)  Pain Intensity 1: 0 (04/06/18 1728)   Managed    Neurological Status: At baseline    Mental Status and Level of Consciousness: Arousable    Pulmonary Status:   O2 Device: Room air (04/06/18 1728)   Adequate oxygenation and airway patent    Complications related to anesthesia: None    Post-anesthesia assessment completed.  No concerns    Signed By: Marina Kennedy MD     April 6, 2018

## 2018-04-06 NOTE — PROGRESS NOTES
Cardiac Cath Lab Recovery Arrival Note:      Vishal Bolanos arrived to Cardiac Cath Lab, Recovery Area. Staff introduced to patient. Patient identifiers verified with NAME and DATE OF BIRTH. Procedure verified with patient. Consent forms reviewed and signed by patient or authorized representative and verified. Allergies verified. Patient informed of procedure and plan of care. Questions answered with review. Patient prepped for procedure, per orders from physician, prior to arrival.    Patient on cardiac monitor, non-invasive blood pressure, SPO2 monitor. Patient is A&Ox 4. Patient reports lower back pain from an exercise injury Tuesday 4/3/2018. Patient in stretcher, in low position, with side rails up, call bell within reach, patient instructed to call of assistance as needed. Patient prep in: [facility] Recovery Area, Bed# 7. Family in: waiting room. Prep by: HENRIQUE Young

## 2018-04-06 NOTE — IP AVS SNAPSHOT
[facility] [place] [address] [place] [address] 
[phone] Patient: Luciano Snider MRN: [id number] CEK:[id number]/[id number] A check camille indicates which time of day the medication should be taken. My Medications START taking these medications Instructions Each Dose to Equal  
 Morning Noon Evening Bedtime  
 apixaban 5 mg tablet Commonly known as:  Elsie Paul Your last dose was: Your next dose is: Take 1 Tab by mouth two (2) times a day. Indications: PREVENT THROMBOEMBOLISM IN CHRONIC ATRIAL FIBRILLATION  
 5 mg CONTINUE taking these medications Instructions Each Dose to Equal  
 Morning Noon Evening Bedtime  
 multivitamin tablet Commonly known as:  ONE A DAY Your last dose was: Your next dose is: Take 1 Tab by mouth daily. 1 Tab PROBIOTIC 4X PO Your last dose was: Your next dose is: Take  by mouth. STOP taking these medications   
 aspirin 81 mg tablet Where to Get Your Medications These medications were sent to [address]  [address] [facility], [address] [place] [id number] Phone:  [phone]  
  apixaban 5 mg tablet

## 2018-04-06 NOTE — ANESTHESIA PREPROCEDURE EVALUATION
Anesthetic History   No history of anesthetic complications            Review of Systems / Medical History  Patient summary reviewed, nursing notes reviewed and pertinent labs reviewed    Pulmonary  Within defined limits                 Neuro/Psych       CVA  TIA     Cardiovascular            Dysrhythmias : atrial fibrillation      Exercise tolerance: >4 METS     GI/Hepatic/Renal               Comments: Ulcerative colitis Endo/Other             Other Findings            Physical Exam    Airway  Mallampati: I  TM Distance: > 6 cm  Neck ROM: normal range of motion   Mouth opening: Normal     Cardiovascular    Rhythm: regular  Rate: normal         Dental  No notable dental hx       Pulmonary  Breath sounds clear to auscultation               Abdominal         Other Findings            Anesthetic Plan    ASA: 2  Anesthesia type: general          Induction: Intravenous  Anesthetic plan and risks discussed with: Patient

## 2018-04-06 NOTE — H&P
Cardiac Electrophysiology H/P Note     Subjective:      Petty Eli is a 58 y.o. patient who is seen for atrial fibrillation and atrial flutter ablation  I had seen him initially at [facility] and cardioverted his atrial fibrillation in 10/2011. At the time he had small bowel surgery. In the past he has sinus bradycardia at rest and with exercise so he does not want to take medications every day   Reported history of stroke related to an air embolism. Was admitted at [facility] for bowel obstruction at that time. Had a central line removed and within 2 minutes CP/SOB sx-found to be an air embolism. Had a 13 day hospital stay with this. Had left sided hemiparesis which resolved within a few days. Denied neuro residual deficits     He only takes metoprolol and flecainide prn (pill in the pocket)  He takes aspirin. He thinks that he has been in atrial fibrillation more frequent than he would like to believe. The patient does not have significant fatigue or palpitation symptoms because the rate is controlled. He had the calcium score done ordered by his primary care Dr. Vicenta Ravi and told me that it is 0 score  His echo was done and he was in atrial fibrillation, LA size enlarged, LVEF normal  He has MR  He called back and said he now wants to have atrial fibrillation ablation done  He is symptomatic and medications do not work  Chest CT was done and he has PV anatomy with accessory vein to RSPV    Patient Active Problem List   Diagnosis Code    Palpitations R00.2    Paroxysmal atrial fibrillation ([address] I48.0    Other and unspecified hyperlipidemia E78.5    Atrial fibrillation with rapid ventricular response ([address] I48.91    Paroxysmal atrial flutter ([address] I48.92    S/P ablation of atrial fibrillation Z98.890, Z86.79     No current facility-administered medications on file prior to encounter.       Current Outpatient Prescriptions on File Prior to Encounter   Medication Sig Dispense Refill    multivitamin (ONE A DAY) tablet Take 1 Tab by mouth daily.  aspirin 81 mg tablet Take 162 mg by mouth daily.  B INFANTIS/B ANI/B LUIS FERNANDO/B BIFID (PROBIOTIC 4X PO) Take  by mouth. Current Facility-Administered Medications   Medication Dose Route Frequency Provider Last Rate Last Dose    sodium chloride (NS) flush 5-10 mL  5-10 mL IntraVENous Q8H Jimmy Gonzalez MD        sodium chloride (NS) flush 5-10 mL  5-10 mL IntraVENous PRN Jimmy Gonzalez MD        adenosine (ADENOCARD) injection 6 mg  6 mg IntraVENous ONCE Jimmy Gonzalez MD        atropine injection 0.5 mg  0.5 mg IntraVENous PRN Jimmy Gonzalez MD        saline peripheral flush soln 5 mL  5 mL InterCATHeter PRN Jimmy Gonzalez MD        heparinized saline 2 units/mL infusion 2,000 Units  1,000 mL Irrigation CONTINUOUS Jimmy Gonzalez MD        heparin (porcine) 1,000 unit/mL injection 5,000 Units  5,000 Units IntraVENous Multiple Jimmy Gonzalez MD        heparin 25,000 units in D5W 250 ml infusion  18-36 Units/kg/hr IntraVENous CONTINUOUS Jimmy Gonzalez MD        protamine injection 1-60 mg  1-60 mg IntraVENous ONCE Jimmy Gonzalez MD        heparinized saline 2 units/mL infusion 1,000 Units  500 mL Irrigation ONCE Jimmy Gonzalez MD        benzocaine (HURRICANE) 20 % spray   Mucous Membrane PRN Jimmy Gonzalez MD        fentaNYL citrate (PF) injection  mcg   mcg IntraVENous Multiple Jimmy Gonzalez MD        midazolam (VERSED) injection 0.5-10 mg  0.5-10 mg IntraVENous Multiple Jimmy Gonzalez MD         Allergies   Allergen Reactions    Cephalosporins Other (comments)     Intrahepatic cholestasis with cephalosporin, bilirubin valentina as high as 6. NOT AN ALLERGY, but rather an adverse reaction. He could likely tolerate a short course if the risk < benefit.      Past Medical History:   Diagnosis Date    Atrial fibrillation with rapid ventricular response ([address] 10/15/2011    [facility] by Dr. El Ruiz 2011    DVT (deep venous thrombosis) [facility]     damon 4/08 around time of bowel surgery;  and again in 2011 after bowel surgery;  coagulopathy workup was OK    Embolism ([address] 4/1/2008    s/p small bowel surgery, at time of catheter removal reduced EF immediately, improved to normal on subsequent echos    PAF (paroxysmal atrial fibrillation) ([address]     present 15 years, had [facility] in 2011 during admission for SBO    Stroke ([facility] 4/5/2008    air embolism after central line removal; s/p small bowel surgery    Ulcerative colitis     s/p total colectomy     Past Surgical History:   Procedure Laterality Date    CARDIOVERSION EXTERNAL  10/17/2011         HX COLECTOMY      total colectomy with IPAA for UC    HX LYSIS OF ADHESIONS      HX OTHER SURGICAL      Echo 10/11 - LVEF 55-60%, mild LAE, mild MR    HX SMALL BOWEL RESECTION       Family History   Problem Relation Age of Onset    Arrhythmia Sister      Social History   Substance Use Topics    Smoking status: Never Smoker    Smokeless tobacco: Never Used    Alcohol use 2.0 oz/week     4 drink(s) per week      Comment: socially        Review of Systems:   Constitutional: Negative for fever, chills, weight loss, malaise/fatigue. HEENT: Negative for nosebleeds, vision changes. Respiratory: Negative for cough, hemoptysis  Cardiovascular: Negative for chest pain, + palpitations, no orthopnea, claudication, leg swelling, syncope, and PND. Gastrointestinal: Negative for nausea, vomiting, diarrhea, blood in stool and melena. Genitourinary: Negative for dysuria, and hematuria. Musculoskeletal: Negative for myalgias, arthralgia. Skin: Negative for rash. Heme: Does not bleed or bruise easily.    Neurological: Negative for speech change and focal weakness     Objective:     Visit Vitals    /90 (BP 1 Location: Right arm, BP Patient Position: At rest)    Pulse 85    Temp 98.4 °F (36.9 °C)    Resp 18    Ht 6' 5\" (1.956 m)    Wt 220 lb (99.8 kg)    SpO2 97%    BMI 26.09 kg/m2      Physical Exam:   Constitutional: well-developed and well-nourished. No respiratory distress. Head: Normocephalic and atraumatic. Eyes: Pupils are equal, round  ENT: hearing normal  Neck: supple. No JVD present. Cardiovascular: Normal rate, irregular rhythm. Exam reveals no gallop and no friction rub. 2/6 systolic murmur heard. Pulmonary/Chest: Effort normal and breath sounds normal. No wheezes. Abdominal: Soft, no tenderness. Musculoskeletal: no edema. Neurological: alert,oriented. Skin: Skin is warm and dry  Psychiatric: normal mood and affect. Behavior is normal. Judgment and thought content normal.         Assessment/Plan:   Persistent atrial fibrillation and counterclockwise atrial flutter type with symptoms  Hx of cardioversion  Pill in pocket use and failed to maintain NSR  Mild MR, LAE    He and his wife agree to BUD and if no thrombus EP study and ablation of atrial flutter, fibrillation today    Will be on eliquis post procedure    Risks include but are not limited to bleeding in the groin, infection in the groin and/or heart valves, fistula between the groin arteries and veins, valvular damage, diaphragmatic paralysis, aortic dissection, heart attack, stroke, blood clot in the leg, pulmonary embolism, lung collapse (pneumothorax or hemothorax), heart collapse (pericardial tamponade), death. The added risks for left atrial ablation may be atrial-esophageal fistula, pulmonary vein stenoses, kidney failure (from contrast injection), higher risk of bleeding, stroke and heart attack. Elective or emergency surgery may be required to repair some of these complications. Prolonged hospitalization would be required. General anesthesia and transeptal catheterization may be required for the procedure    Thank you for involving me in this patient's care and please call with further concerns or questions. Hari Todd M.D.   Electrophysiology/Cardiology  [facility] [place]  [place] [address] [address] [facility], [address] [place] [address]  [phone]

## 2018-04-06 NOTE — DISCHARGE SUMMARY
Cardiology Discharge Summary               Patient ID:  Janine Mejia  [id number]  34 y.o.  1955    Admit Date: 4/6/2018    Discharge Date: 4/7/2018     Admitting Physician: Susan Conteh MD     Discharge Physician: Lani Jacome MD    Admission Diagnoses: BUD\AFIB\ANES;S/P ablation of atrial fibrillation;Persisten*    Discharge Diagnoses: Principal Problem:    S/P ablation of atrial fibrillation (4/6/2018)    Active Problems:    Paroxysmal atrial flutter ([address] (9/24/2015)      Persistent atrial fibrillation ([address] (4/6/2018)      Status post catheter ablation of atrial flutter (4/6/2018)      Overview: 4/6/2018        Discharge Condition: Stable    Cardiology Procedures this Admission:  BUD/Cardioversion  ablation atrial flutter and fibrillation    Hospital Course: patient presented with symptomatic atrial flutter and fibrillation that have failed cardioversion and pill in pocket medications. He was in persistent atrial fibrillation flutter with mild RVR today and BUD showed no thrombus but moderate LA enlargement and mild MR. He underwent cardioversion and ablation of atrial fibrillation with pulmonary vein isolations and then atrial flutter ablation at cavotricuspid isthmus with bidirectional line of block  He stayed overnight for observation after heparin anticoagulant use during ablation and he was under general anesthesia  He feels goo the day of  Dc  Right and left groin ok with no bleeding hematomas or pulsatile masses  He remains in nsr and completely asymptomatic    Consults: None    Discharge Exam:       General Appearance:  Well developed, well nourished,alert and oriented x 3, and individual in no acute distress. Ears/Nose/Mouth/Throat:   Hearing grossly normal.         Neck: Supple. Chest:   Lungs clear to auscultation bilaterally. Cardiovascular:  Regular rate and rhythm, 2/6 systolic murmur. Abdomen:   Soft, non-tender    Extremities: No edema bilaterally. Skin: Warm and dry. Disposition: home    Patient Instructions:   Current Discharge Medication List      START taking these medications    Details   apixaban (ELIQUIS) 5 mg tablet Take 1 Tab by mouth two (2) times a day. Indications: PREVENT THROMBOEMBOLISM IN CHRONIC ATRIAL FIBRILLATION  Qty: 60 Tab, Refills: 3         CONTINUE these medications which have NOT CHANGED    Details   multivitamin (ONE A DAY) tablet Take 1 Tab by mouth daily. B INFANTIS/B ANI/B LUIS FERNANDO/B BIFID (PROBIOTIC 4X PO) Take  by mouth. STOP taking these medications       aspirin 81 mg tablet Comments:   Reason for Stopping: on eliquis post ablation                Referenced discharge instructions provided by nursing for diet and activity.     Follow-up with     Future Appointments  Date Time Provider Maryam Hines   4/19/2018 9:20 AM Barb Fry  [address]     Signed:  Dereck Kuhn MD  4/7/2018  5:19 PM

## 2018-04-06 NOTE — PROGRESS NOTES
Cardiac Cath Lab Procedure Area Arrival Note:    Alona Pro arrived to Cardiac Cath Lab, Procedure Area. Patient identifiers verified with NAME and DATE OF BIRTH. Procedure verified with patient. Consent forms verified. Allergies verified. Patient informed of procedure and plan of care. Questions answered with review. Patient voiced understanding of procedure and plan of care. Patient on cardiac monitor, non-invasive blood pressure, SPO2 monitor. On ra, placed on O2 @ 2 lpm via nc. IV of ns on pump at 25 ml/hr. Patient status doing well without problems. Patient is A&Ox 3. Patient reports no pain. Patient medicated during procedure with orders obtained and verified by Dr. Dean Maradiaga. Refer to patients Cardiac Cath Lab PROCEDURE REPORT for vital signs, assessment, status, and response during procedure, printed at end of case. Printed report on chart or scanned into chart. Anesthesia here for sedation and airway management during a fib ablation.  Patient in a fib

## 2018-04-06 NOTE — IP AVS SNAPSHOT
[address] 
[phone] Patient: Luciano Snider MRN: [id number] BRITTANIE:[id number] About your hospitalization You were admitted on:  April 6, 2018 You last received care in the:  [facility] You were discharged on:  April 7, 2018 Why you were hospitalized Your primary diagnosis was:  S/P Ablation Of Atrial Fibrillation Your diagnoses also included:  Persistent Atrial Fibrillation (Hcc), Status Post Catheter Ablation Of Atrial Flutter, Paroxysmal Atrial Flutter (Hcc) Follow-up Information Follow up With Details Comments Contact Info Felix Molina MD   [address] 
[phone] Your Scheduled Appointments Thursday April 19, 2018  9:20 AM EDT  
ESTABLISHED PATIENT with Kori Andrea MD  
[facility] [place] [address] [place] [address]  [address] [place] [address]  
[phone] Discharge Orders None A check camille indicates which time of day the medication should be taken. My Medications START taking these medications Instructions Each Dose to Equal  
 Morning Noon Evening Bedtime  
 apixaban 5 mg tablet Commonly known as:  Elsie Paul Your last dose was: Your next dose is: Take 1 Tab by mouth two (2) times a day. Indications: PREVENT THROMBOEMBOLISM IN CHRONIC ATRIAL FIBRILLATION  
 5 mg CONTINUE taking these medications Instructions Each Dose to Equal  
 Morning Noon Evening Bedtime  
 multivitamin tablet Commonly known as:  ONE A DAY Your last dose was: Your next dose is: Take 1 Tab by mouth daily. 1 Tab PROBIOTIC 4X PO Your last dose was: Your next dose is: Take  by mouth. STOP taking these medications aspirin 81 mg tablet Where to Get Your Medications These medications were sent to [place] [address], [address]  [address] [facility], [address] [place] [address] Phone:  [phone]  
  apixaban 5 mg tablet Discharge Instructions Patient Instructions Post-EP study and ablation 1. No heavy lifting or exercises for 1 week. This includes the following: Do not push or move furniture, jog or run 2. Do not drive for 2 days. 3.  Call Dr. Liborio Gutierrez at [phone] if you experience any of the following symptoms: 
Dizziness, lightheadedness, fainting spells Lack of energy Shortness of breath Rapid heart rate Chest or muscle twitches Blurry vision, double vision, weakness, numbness Nausea, vomiting Fever Bleeding in the stool, black stool Leg swelling, pain 4. Follow-up with Dr. Liborio Gutierrez Future Appointments Date Time Provider Maryam Hines 4/19/2018 9:20 AM Anayeli Florence  [address] [facility] Announcement We are excited to announce that we are making your provider's discharge notes available to you in Smartling. You will see these notes when they are completed and signed by the physician that discharged you from your recent hospital stay. If you have any questions or concerns about any information you see in Smartling, please call the Health Information Department where you were seen or reach out to your Primary Care Provider for more information about your plan of care. Introducing [facility]! Dylon Agosto introduces Smartling patient portal. Now you can access parts of your medical record, email your doctor's office, and request medication refills online. 1. In your internet browser, go to [email or web address] 2. Click on the First Time User? Click Here link in the Sign In box. You will see the New Member Sign Up page. 3. Enter your [email or web address] Access Code exactly as it appears below. You will not need to use this code after youve completed the sign-up process. If you do not sign up before the expiration date, you must request a new code. · [email or web address] Access Code: [email or web address] Expires: 5/30/2018  5:23 PM 
 
4. Enter the last four digits of your Social Security Number (xxxx) and Date of Birth (mm/dd/yyyy) as indicated and click Submit. You will be taken to the next sign-up page. 5. Create a [employer] ID. This will be your [email or web address] login ID and cannot be changed, so think of one that is secure and easy to remember. 6. Create a [email or web address] password. You can change your password at any time. 7. Enter your Password Reset Question and Answer. This can be used at a later time if you forget your password. 8. Enter your e-mail address. You will receive e-mail notification when new information is available in [address] 9. Click Sign Up. You can now view and download portions of your medical record. 10. Click the Download Summary menu link to download a portable copy of your medical information. If you have questions, please visit the Frequently Asked Questions section of the [email or web address] website. Remember, [email or web address] is NOT to be used for urgent needs. For medical emergencies, dial 911. Now available from your iPhone and Android! Introducing Wayne Guzman As a [facility] patient, I wanted to make you aware of our electronic visit tool called Wayen Guzman. [facility] 24/7 allows you to connect within minutes with a medical provider 24 hours a day, seven days a week via a mobile device or tablet or logging into a secure website from your computer. You can access Wayne Guzman from anywhere in the [place].  
 
A virtual visit might be right for you when you have a simple condition and feel like you just dont want to get out of bed, or cant get away from work for an appointment, when your regular [facility] [employer] provider is not available (evenings, weekends or holidays), or when youre out of town and need minor care. Electronic visits cost only $49 and if the [employer] 24/7 provider determines a prescription is needed to treat your condition, one can be electronically transmitted to a nearby pharmacy*. Please take a moment to enroll today if you have not already done so. The enrollment process is free and takes just a few minutes. To enroll, please download the [employer] 24/7 yobani to your tablet or phone, or visit [email or web address] to enroll on your computer. And, as an [address] [place] [address] patient with a [employer] account, the results of your visits will be scanned into your electronic medical record and your primary care provider will be able to view the scanned results. We urge you to continue to see your regular [facility] [employer] provider for your ongoing medical care. And while your primary care provider may not be the one available when you seek a [employer] virtual visit, the peace of mind you get from getting a real diagnosis real time can be priceless. For more information on [employer], view our Frequently Asked Questions (FAQs) at [email or web address]. Sincerely, 
 
Jeremiah Hurst MD 
Chief Medical Officer [address] [place] [address] *:  certain medications cannot be prescribed via [employer] Providers Seen During Your Hospitalization Provider Specialty Primary office phone Verenice Gtz MD Cardiology [phone] Your Primary Care Physician (PCP) Primary Care Physician Office Phone Office Fax Marylene Purser  You are allergic to the following Allergen Reactions Cephalosporins Other (comments) Intrahepatic cholestasis with cephalosporin, bilirubin valentina as high as 6. NOT AN ALLERGY, but rather an adverse reaction. He could likely tolerate a short course if the risk < benefit. Recent Documentation Height Weight BMI Smoking Status 1.956 m 99.3 kg 25.96 kg/m2 Never Smoker Emergency Contacts Name Discharge Info Relation Home Work Mobile Ashlyn Vines DISCHARGE CAREGIVER [3] Spouse [3] [phone] Patient Belongings The following personal items are in your possession at time of discharge: 
  Dental Appliances: None  Visual Aid: None      Home Medications: None   Jewelry: None  Clothing: At bedside, Pants, Shirt    Other Valuables: Cell Phone, At bedside Please provide this summary of care documentation to your next provider. Signatures-by signing, you are acknowledging that this After Visit Summary has been reviewed with you and you have received a copy. Patient Signature:  ____________________________________________________________ Date:  ____________________________________________________________  
  
Estefany Piety Provider Signature:  ____________________________________________________________ Date:  ____________________________________________________________

## 2018-04-06 NOTE — PROCEDURES
Cardiac Procedure Note   Patient: Ani Hernandez  MRN: [id number]  SSN: [id number]   YOB: 1955 Age: 58 y.o.   Sex: male    Date of Procedure: 4/6/2018   Pre-procedure Diagnosis: persistent atrial fibrillation atrial flutter  Post-procedure Diagnosis: same  Procedure: EP Study and Ablation  :  Dr. Damian Kat MD    Assistant(s):  None  Anesthesia: general anesthesia  Estimated Blood Loss: Less than 10 mL   Specimens Removed: None  Findings: PVI all 4 veins  Bidirectional line of block and termination of atrial flutter (CTI)  Complications: None   Implants:  None  Signed by:  Damian Kat MD  4/6/2018  5:15 PM

## 2018-04-07 VITALS
BODY MASS INDEX: 25.85 KG/M2 | SYSTOLIC BLOOD PRESSURE: 125 MMHG | RESPIRATION RATE: 18 BRPM | TEMPERATURE: 98.7 F | WEIGHT: 218.92 LBS | HEIGHT: 77 IN | DIASTOLIC BLOOD PRESSURE: 79 MMHG | HEART RATE: 57 BPM | OXYGEN SATURATION: 95 %

## 2018-04-07 PROCEDURE — 74011250637 HC RX REV CODE- 250/637: Performed by: INTERNAL MEDICINE

## 2018-04-07 PROCEDURE — 99218 HC RM OBSERVATION: CPT

## 2018-04-07 RX ADMIN — Medication 10 ML: at 06:00

## 2018-04-07 RX ADMIN — APIXABAN 5 MG: 5 TABLET, FILM COATED ORAL at 08:20

## 2018-04-07 RX ADMIN — Medication 10 ML: at 07:04

## 2018-04-07 NOTE — PROGRESS NOTES
1025 - discharge order received, tele and piv removed per hospital protocol, reviewed discharge instructions and medications, no further questions, (signature pad not working, paper copy signed and on chart), discharged home with family.

## 2018-04-07 NOTE — PROCEDURES
ELECTROPHYSIOLOGY (EP) REPORT    DATE OF PROCEDURE: 4/6/2018    PROCEDURES:     1. Comprehensive Electrophysiology study including CS/LA recording and pacing  2. Ablation of cavo-tricuspid isthmus dependent atrial flutter, and ablations of persistent atrial fibrillation by pulmonary vein isolations. 3. Intracardiac echocardiogram was utilized and transseptal catheterization were performed. 4. Fluoroscopy was also used along with 3-dimensional electroanatomical ERIC Precision mapping (activation and voltage mappings were also performed). BRIEF HISTORY: This is a 58 y.o. man who had a history of symptomatic persistent atrial fibrillation and atrial flutter with previous cardioversion and attempted at medical treatment with pill in pocket antiarrhythmic method but patient is in persistent atrial fibrillation with occasional rapid ventricular rate. He did not want to use medications and he wants to have ablation. The risks and benefits the procedure have been reviewed with him on several occasions, and he agreed to proceed. Transesophageal echocardiogram had been done earlier and did not show thrombus. PROCEDURE IN DETAIL. After the informed written consent had been obtained, the patient was brought to the electrophysiology suite, where he was prepped and draped in usual sterile fashion. He had undergone general anesthesia. Access was obtained into the left and right femoral veins on a total of 4 occasions. Over the guidewires, a 6-Telugu and a 10-Telugu introducer were inserted into the left femoral vein, and 6 F and 8 F sheaths were inserted into the right femoral vein.     Via 3 D mapping and fluoroscopy, a decapolar Bard catheter was attempted to be placed in the coronary sinus for pacing and recording and this was not successful so a quadripolar Lito catheter was placed in the coronary sinus for recording and pacing  Now attention is turned to transseptal puncture and ablation of atrial fibrillation  1 long guidewires was then advanced inside the right femoral vein toward the SVC. The intracardiac echocardiogram used was advanced under fluoroscopy into the mid-right atrium. The echocardiogram catheter is a St Daquan. The cardiac structure was examined. The interatrial septum was brought in view, and it is  thick, in some view. The BaylissTransseptal Catheterization needle was then advanced inside the Agilis sheath into the SVC. The system was then pulled back under fluoroscopy and also with the intracardiac echocardiogram. Once the tenting of fossa ovalis was observed, the needle was then advanced into the left atrium was successful. The needle was pulled back while the dilator and the long sheath was then advanced inside the left atrium. The dilator was removed along with the needle at the end. The sheath was then flushed with the pressurized heparinized saline fluid. The patient was then started with heparin in high bolus repetitive amount and drip. ACT was checked every 20 minutes to adjust the heparin dose with goal ACT >/=250 seconds. Due to tough fossa, only one transseptal access was obtained. Then, the 3-dimensional electroanatomical map of the left atrium was then obtained using the Spiral multielectrode circumferential St. Daquan catheter. The pulmonary veins were obtained and matched up with the cardiac CTA images that had been obtained in the past. The patient has 4 discrete pulmonary veins: 2 on the right and two on the left. There is a vertical small accessory vein that drains into the right superior pulmonary vein. The mapping ablation catheter used is a F/J curved St Daquan FlexAbility saline irrigated tip ablation catheter, 3.5-mm tip. The pulmonary vein was accessed using the Spiral multielectrode catheter and also with the mapping ablation catheter when it was not feasible for the wide circumferential catheter.  The wide circumferential ablations at 30-35W around the pulmonary veins were performed. The local voltage was abated outside and around all pulmonary veins. 200 J cardioversion converted to sinus bradycardia. Both entry and exit blocks were obtained for all 4 veins. While pacing in right atrium appears to be atrial flutter with tachycardia cycle length 286 ms. This appears to be cavotricuspid isthmus dependent and therefore linear ablations were done from the tricuspid valve annulus edge, and the RF energy was delivered at 40 pisano in retrograde fashion. Local voltages were abated after the ablation. 3 Lines of ablations were performed. Atrial flutter was terminated and bidirectional line of block was confirmed by pacing from the CS catheter (LA pacing) and the yuridia terminalis catheter (decapolar St Daquan livewire) in the right atrium. At this time, the ablation  catheter was placed at the His bundle area and the baseline interval AH/HV were obtained. The catheter was then placed again at the right ventricle, and then VA conduction was evaluated by pacing here. AV clare antegrade conduction was evaluated by pacing from the yuridia terminalis catheter (RA pacing)    Patient remains in sinus rhythm     FINDINGS:   QRS 78 ms  AH intervals 66 msec, HV 55 msec   AV clare wenckebach was 310 ms, AV clare ERP was 250 ms @ 600 ms  Retrograde wenckebach was less than 380 ms but this was stopped due to drop in blood pressure    Post ablation: lines of block was confirmed bi-directionally, conduction delay of 060 ms    COMPLICATIONS: None. Estimated blood loss is 10 ML  Specimen removed: NONE  Protamine 50 mg total was used to reverse the heparin effect after completion of the procedure as ACT was down to 125 seconds. The sheaths were removed, and manual compression was applied to both inguinal areas and achieved good hemostasis. The patient was then wakened up from the general anesthesia without complication.  He moved all the extremities equally and there was no neurological deficit. The patient opened his eyes and followed commands and he talked. Hemodynamically, the patient was stable and in sinus rhythm    ASSESSMENT AND PLAN: The patient will be monitored in the CVSU overnight. His clinical status will be assessed. He will be started on eliquis tomorrow. Further monitoring of arrhythmia return will be continued in the office. He does not want to use pill in the pocket antiarrhythmic medication any more.

## 2018-04-07 NOTE — PROGRESS NOTES
1930: Bedside shift change report given to Thu CASTANEDA (oncoming nurse) by Law Allen RN (offgoing nurse). Report included the following information SBAR, Kardex, Procedure Summary, Intake/Output, MAR and Recent Results. 2015: Black d/c. Pt off bedrest.    0730: Bedside shift change report given to Negin CASTANEDA (oncoming nurse) by Luz Maria Greene RN (offgoing nurse). Report included the following information SBAR, Kardex, Procedure Summary, Intake/Output, MAR and Recent Results. Problem: Falls - Risk of  Goal: *Absence of Falls  Document Anel Fall Risk and appropriate interventions in the flowsheet. Outcome: Progressing Towards Goal  Fall Risk Interventions:     Pt up ad zarina. Problem: Cath Lab Procedures: Post-Cath Day of Procedure (Initiate SCIP Measures for Post-Op Care)  Goal: Activity/Safety  Outcome: Progressing Towards Goal  Pt up ad zarina without gait disturbance.    Goal: *Hemodynamically stable  Outcome: Progressing Towards Goal  Visit Vitals    /77 (BP 1 Location: Left arm, BP Patient Position: At rest)    Pulse 77    Temp 98.9 °F (37.2 °C)    Resp 18    Ht 6' 5\" (1.956 m)    Wt 99.8 kg (220 lb)    SpO2 96%    BMI 26.09 kg/m2

## 2018-04-07 NOTE — DISCHARGE INSTRUCTIONS
Patient Instructions Post-EP study and ablation    1. No heavy lifting or exercises for 1 week. This includes the following:  Do not push or move furniture, jog or run    2. Do not drive for 2 days. 3.  Call Dr. Gallegos Sportsman at [phone] if you experience any of the following symptoms:  Dizziness, lightheadedness, fainting spells  Lack of energy  Shortness of breath  Rapid heart rate  Chest or muscle twitches  Blurry vision, double vision, weakness, numbness  Nausea, vomiting  Fever  Bleeding in the stool, black stool  Leg swelling, pain    4.   Follow-up with Dr. Magdalena Ireland  Date Time Provider Maryam Hines   4/19/2018 9:20 AM Jimmy Gonzalez  [address]

## 2018-04-10 ENCOUNTER — TELEPHONE (OUTPATIENT)
Dept: CARDIOLOGY CLINIC | Age: 63
End: 2018-04-10

## 2018-04-10 RX ORDER — DILTIAZEM HYDROCHLORIDE 120 MG/1
120 CAPSULE, COATED, EXTENDED RELEASE ORAL DAILY
Qty: 30 CAP | Refills: 6 | Status: SHIPPED | OUTPATIENT
Start: 2018-04-10 | End: 2019-01-22

## 2018-04-10 NOTE — TELEPHONE ENCOUNTER
Verified patient with two types of identifiers. Called to let us know that he was still in a-fib. States that he checked his pulse this morning and it was in the 90's. He is unsure what it is now. He feels that he is in a-fib. Per Dr Mike Mai start cardizem 120mg every day with a pulse rate of 100bpm or more. Advised patient of this and advised him to check BP and pulse every day while on medication. Patient verbalizes understanding. And will call with any questions or concerns.

## 2018-04-11 LAB
ACT BLD: 125 SECS (ref 79–138)
ACT BLD: 197 SECS (ref 79–138)
ACT BLD: 230 SECS (ref 79–138)
ACT BLD: 252 SECS (ref 79–138)
ACT BLD: 268 SECS (ref 79–138)

## 2018-04-19 ENCOUNTER — OFFICE VISIT (OUTPATIENT)
Dept: CARDIOLOGY CLINIC | Age: 63
End: 2018-04-19

## 2018-04-19 VITALS
SYSTOLIC BLOOD PRESSURE: 118 MMHG | DIASTOLIC BLOOD PRESSURE: 70 MMHG | HEIGHT: 77 IN | WEIGHT: 223 LBS | BODY MASS INDEX: 26.33 KG/M2

## 2018-04-19 DIAGNOSIS — I48.19 PERSISTENT ATRIAL FIBRILLATION (HCC): Primary | ICD-10-CM

## 2018-04-19 DIAGNOSIS — Z86.79 S/P ABLATION OF ATRIAL FIBRILLATION: ICD-10-CM

## 2018-04-19 DIAGNOSIS — Z98.890 STATUS POST CATHETER ABLATION OF ATRIAL FLUTTER: ICD-10-CM

## 2018-04-19 DIAGNOSIS — I51.7 LAE (LEFT ATRIAL ENLARGEMENT): ICD-10-CM

## 2018-04-19 DIAGNOSIS — I48.92 PAROXYSMAL ATRIAL FLUTTER (HCC): ICD-10-CM

## 2018-04-19 DIAGNOSIS — Z98.890 S/P ABLATION OF ATRIAL FIBRILLATION: ICD-10-CM

## 2018-04-19 RX ORDER — FLECAINIDE ACETATE 100 MG/1
100 TABLET ORAL 2 TIMES DAILY
Qty: 60 TAB | Refills: 5 | Status: SHIPPED | OUTPATIENT
Start: 2018-04-19 | End: 2018-10-29 | Stop reason: SDUPTHER

## 2018-04-19 RX ORDER — FLECAINIDE ACETATE 100 MG/1
100 TABLET ORAL 2 TIMES DAILY
Qty: 60 TAB | Refills: 5 | OUTPATIENT
Start: 2018-04-19

## 2018-04-19 RX ORDER — FLECAINIDE ACETATE 50 MG/1
100 TABLET ORAL 2 TIMES DAILY
Qty: 60 TAB | Refills: 5 | Status: SHIPPED | OUTPATIENT
Start: 2018-04-19 | End: 2018-04-19 | Stop reason: SDUPTHER

## 2018-04-19 NOTE — TELEPHONE ENCOUNTER
Pharmacy verified. Pt is at pharmacy now. Requested Prescriptions     Pending Prescriptions Disp Refills    flecainide (TAMBOCOR) 100 mg tablet 60 Tab 5     Sig: Take 1 Tab by mouth two (2) times a day. Thanks!

## 2018-04-19 NOTE — TELEPHONE ENCOUNTER
Verified patient with two types of identifiers. Patient the prescription was sent to the wrong pharmacy. Notified patient that was the pharmacy he confirmed at his office visit. New pharmacy changed in [address] [place] [address]. Patient states pharmacy sent over prescription to the correct pharmacy. Patient verbalized understanding and will call with any other questions.

## 2018-04-19 NOTE — MR AVS SNAPSHOT
[address] [facility] [address] [place] 
[phone] Patient: Theresa Juarez MRN: [id number] Visit Information Date & Time Provider Department Dept. Phone Encounter #  
 4/19/2018  9:20 AM Sonam Charles MD [facility] [phone] [id number] Your Appointments 4/26/2018  8:00 AM  
ESTABLISHED PATIENT with Sonam Charles MD  
[facility] ([address] [facility]) Appt Note: 1 week f/u  
 [address]  [address] [place] [address]  
[address] [facility],[address] [facility] [place] [address] Upcoming Health Maintenance Date Due DTaP/Tdap/Td series (1 - Tdap) 12/20/1976 FOBT Q 1 YEAR AGE 50-75 12/20/2005 ZOSTER VACCINE AGE 60> 10/20/2015 Influenza Age 5 to Adult 8/1/2017 Allergies as of 4/19/2018  Review Complete On: 4/19/2018 By: Gabriela Woody RN Severity Noted Reaction Type Reactions Cephalosporins Medium 10/20/2011   Systemic Other (comments) Intrahepatic cholestasis with cephalosporin, bilirubin valentina as high as 6. NOT AN ALLERGY, but rather an adverse reaction. He could likely tolerate a short course if the risk < benefit. Current Immunizations  Never Reviewed No immunizations on file. Not reviewed this visit You Were Diagnosed With   
  
 Codes Comments Persistent atrial fibrillation (HCC)    -  Primary ICD-10-CM: I48.1 ICD-9-CM: 427.31 Vitals BP Height(growth percentile) Weight(growth percentile) BMI Smoking Status 118/70 (BP 1 Location: Left arm, BP Patient Position: Sitting) 6' 5\" (1.956 m) 223 lb (101.2 kg) 26.44 kg/m2 Never Smoker Vitals History BMI and BSA Data Body Mass Index Body Surface Area  
 26.44 kg/m 2 2.34 m 2 Preferred Pharmacy Pharmacy Name Phone [address] [place] [facility]ffer [phone] Your Updated Medication List  
  
   
This list is accurate as of 4/19/18  9:59 AM.  Always use your most recent med list.  
  
  
  
  
 apixaban 5 mg tablet Commonly known as:  Ebony Bloodgood Take 1 Tab by mouth two (2) times a day. Indications: PREVENT THROMBOEMBOLISM IN CHRONIC ATRIAL FIBRILLATION  
  
 dilTIAZem  mg ER capsule Commonly known as:  CARDIZEM CD Take 1 Cap by mouth daily. flecainide 100 mg tablet Commonly known as:  TAMBOCOR Take 1 Tab by mouth two (2) times a day. multivitamin tablet Commonly known as:  ONE A DAY Take 1 Tab by mouth daily. PROBIOTIC 4X PO Take  by mouth. Prescriptions Sent to Pharmacy Refills  
 flecainide (TAMBOCOR) 100 mg tablet 5 Sig: Take 1 Tab by mouth two (2) times a day. Class: Normal  
 Pharmacy: [facility] [address] [place] [address] Drive Ph #: [phone] Route: Oral  
  
We Performed the Following AMB POC EKG ROUTINE W/ 12 LEADS, INTER & REP [20422 CPT(R)] Introducing [facility] [place] [facility] SERVICES! [facility] introduces [facility] patient portal. Now you can access parts of your medical record, email your doctor's office, and request medication refills online. 1. In your internet browser, go to [email or web address] 2. Click on the First Time User? Click Here link in the Sign In box. You will see the New Member Sign Up page. 3. Enter your [facility] Access Code exactly as it appears below. You will not need to use this code after youve completed the sign-up process. If you do not sign up before the expiration date, you must request a new code. · [facility] Access Code: [email or web address] Expires: 5/30/2018  5:23 PM 
 
4. Enter the last four digits of your Social Security Number (xxxx) and Date of Birth (mm/dd/yyyy) as indicated and click Submit. You will be taken to the next sign-up page. 5. Create a [facility]) ID.  This will be your [facility] login ID and cannot be changed, so think of one that is secure and easy to remember. 6. Create a [employer] password. You can change your password at any time. 7. Enter your Password Reset Question and Answer. This can be used at a later time if you forget your password. 8. Enter your e-mail address. You will receive e-mail notification when new information is available in [address] 9. Click Sign Up. You can now view and download portions of your medical record. 10. Click the Download Summary menu link to download a portable copy of your medical information. If you have questions, please visit the Frequently Asked Questions section of the [employer] website. Remember, [facility] is NOT to be used for urgent needs. For medical emergencies, dial 911. Now available from your iPhone and Android! Please provide this summary of care documentation to your next provider. Your primary care clinician is listed as Katya Acevedo. If you have any questions after today's visit, please call [phone].

## 2018-04-19 NOTE — PROGRESS NOTES
Cardiac Electrophysiology Office Note     Subjective:      Vishal Bolanos is a 58 y.o.  male patient whom I had seen at [facility] and cardioverted his atrial fibrillation in 10/2011. At the time he had small bowel surgery. In the past he has sinus bradycardia at rest and with exercise so he does not want to take medications every day   Reported history of stroke related to an air embolism. Was admitted at [facility] for bowel obstruction at that time. Had a central line removed and within 2 minutes CP/SOB sx-found to be an air embolism. Had a 13 day hospital stay with this. Had left sided hemiparesis which resolved within a few days. Denied neuro residual deficits    He only takes metoprolol and flecainide prn (pill in the pocket)  He takes aspirin. He thinks that he has been in atrial fibrillation more frequent than he would like to believe. The patient does not have significant fatigue or palpitation symptoms because the rate is controlled. He had the calcium score done ordered by his primary care Dr. Mariann Luque and told me that it is 0 score    Echo 2018 with normal LVEF  Mitral valve regurgitation is mild  LA size enlargement is 5 cm, increased with AFIB      Patient decided to undergo the catheter ablation for atrial fibrillation or flutter. This was done and he was good for about 3 days then he failed irregular heartbeats again when he was laying down. Since then he been in persistent atrial fibrillation. EKG today confirmed that. Other than that he does not have chest pain shortness of breath bleeding in the stool or urine and has no trouble with swallowing. There is no fever. After the ablation on April 6, 2018, the patient did not want medication except for the blood thinner. I kept him on the rate control medication. His concerns about use medication is bradycardia he may need the pacemaker.     Past Medical History:   Diagnosis Date    Atrial fibrillation with rapid ventricular response ([facility] 10/15/2011    [address] by Dr. Abran Sal 2011    DVT (deep venous thrombosis) ([facility]     jessieirbreonna 4/08 around time of bowel surgery;  and again in 2011 after bowel surgery;  coagulopathy workup was OK    Embolism ([facility] 4/1/2008    s/p small bowel surgery, at time of catheter removal reduced EF immediately, improved to normal on subsequent echos    PAF (paroxysmal atrial fibrillation) ([facility]     present 15 years, had [address] in 2011 during admission for SBO    Stroke ([facility] 4/5/2008    air embolism after central line removal; s/p small bowel surgery    Ulcerative colitis     s/p total colectomy     Past Surgical History:   Procedure Laterality Date    CARDIOVERSION EXTERNAL  10/17/2011         HX COLECTOMY      total colectomy with IPAA for UC    HX LYSIS OF ADHESIONS      HX OTHER SURGICAL      Echo 10/11 - LVEF 55-60%, mild LAE, mild MR    HX SMALL BOWEL RESECTION      MI EPHYS EVL TRNSPTL TX ATRIAL FIB ISOLAT PULM VEIN  4/6/2018         MI ICAR CATHETER ABLATION ARRHYTHMIA ADD ON  4/6/2018          Allergies   Allergen Reactions    Cephalosporins Other (comments)     Intrahepatic cholestasis with cephalosporin, bilirubin valentina as high as 6. NOT AN ALLERGY, but rather an adverse reaction. He could likely tolerate a short course if the risk < benefit. Current Outpatient Prescriptions   Medication Sig    flecainide (TAMBOCOR) 100 mg tablet Take 1 Tab by mouth two (2) times a day.  dilTIAZem CD (CARDIZEM CD) 120 mg ER capsule Take 1 Cap by mouth daily.  apixaban (ELIQUIS) 5 mg tablet Take 1 Tab by mouth two (2) times a day. Indications: PREVENT THROMBOEMBOLISM IN CHRONIC ATRIAL FIBRILLATION    multivitamin (ONE A DAY) tablet Take 1 Tab by mouth daily.  B INFANTIS/B ANI/B LUIS FERNANDO/B BIFID (PROBIOTIC 4X PO) Take  by mouth. No current facility-administered medications for this visit.         SOCIAL  FT employed in sales, lots of travelling  , 2 children  Non-smoker  ETOH- social, 2 times a week, beer  Exercises 5 times a week, 35-45 minutes- cardio and light weights/swim/jog  Caffeine- 2 cups of coffee a day  Eats a heart healthy diet, Mediterranean      Review of Systems:   Constitutional: Negative for fever, chills, weight loss  HEENT: Negative for nosebleeds, vision changes. Respiratory: Negative for cough, hemoptysis, sputum production, and wheezing. Cardiovascular: Negative for chest pain, orthopnea, claudication, leg swelling, syncope, and PND. Gastrointestinal: Negative for nausea, vomiting, diarrhea, constipation, blood in stool and melena. Genitourinary: Negative for dysuria, and hematuria. Musculoskeletal: Negative for myalgias, arthralgia. Skin: Negative for rash. Heme: Does not bleed or bruise easily. Neurological: Negative for speech change and focal weakness     Objective:     Visit Vitals    /70 (BP 1 Location: Left arm, BP Patient Position: Sitting)    Ht 6' 5\" (1.956 m)    Wt 223 lb (101.2 kg)    BMI 26.44 kg/m2      Physical Exam:   Constitutional: well-developed and well-nourished. No distress. Head: Normocephalic and atraumatic. Eyes: Pupils are equal, round  Neck: supple. No JVD present. Cardiovascular: Normal rate, irregular rhythm and normal heart sounds. Exam reveals no gallop and no friction rub. No murmur heard. Pulmonary/Chest: Effort normal and breath sounds normal. No wheezes. Abdominal: Soft, no tenderness. scar  Musculoskeletal: no edema. Neurological: alert,oriented. Skin: Skin is warm and dry  Psychiatric: normal mood and affect. Behavior is normal. Judgment and thought content normal.       Twelve-lead EKG show atrial fibrillation possible atrial flutter atypical type  QRS voltage is low and there is poor R-wave progression    Assessment/Plan:     Encounter Diagnoses   Name Primary?     Persistent atrial fibrillation (HCC) Yes    LAE (left atrial enlargement)     S/P ablation of atrial fibrillation     Status post catheter ablation of atrial flutter     Paroxysmal atrial flutter (HCC)      [id number], he takes an ASA. I recommend him to continue with the Cardizem Eliquis and at the flecainide. In the past when I gave him the pill in the pocket he did not use it. We need to maintain normal sinus rhythm after the ablation. We discussed about electrical cardioversion. We will decide that when he comes back after he is taking flecainide 100 mg twice daily      Thank you for involving me in this patient's care and please call with further concerns or questions. Silviano Gramajo M.D.  [facility]  Electrophysiology/Cardiology  [facility] [place]  [address] [place] [address] [place] [address] [place] [address] [place] [address]  [phone]

## 2018-04-19 NOTE — PROGRESS NOTES
Chief Complaint   Patient presents with    Irregular Heart Beat     a fib     Follow-up     2 week s/p ablation      Verified patient with two types of identifiers. Verified medications with the patient.     Verified patient's pharmacy

## 2018-04-26 ENCOUNTER — OFFICE VISIT (OUTPATIENT)
Dept: CARDIOLOGY CLINIC | Age: 63
End: 2018-04-26

## 2018-04-26 VITALS
HEART RATE: 89 BPM | SYSTOLIC BLOOD PRESSURE: 110 MMHG | DIASTOLIC BLOOD PRESSURE: 72 MMHG | BODY MASS INDEX: 26.33 KG/M2 | WEIGHT: 223 LBS | HEIGHT: 77 IN

## 2018-04-26 DIAGNOSIS — I48.19 PERSISTENT ATRIAL FIBRILLATION (HCC): ICD-10-CM

## 2018-04-26 DIAGNOSIS — Z98.890 S/P ABLATION OF ATRIAL FIBRILLATION: ICD-10-CM

## 2018-04-26 DIAGNOSIS — I48.4 ATYPICAL ATRIAL FLUTTER (HCC): Primary | ICD-10-CM

## 2018-04-26 DIAGNOSIS — I34.0 MITRAL VALVE INSUFFICIENCY, UNSPECIFIED ETIOLOGY: ICD-10-CM

## 2018-04-26 DIAGNOSIS — I07.1 TRICUSPID VALVE INSUFFICIENCY, UNSPECIFIED ETIOLOGY: ICD-10-CM

## 2018-04-26 DIAGNOSIS — Z98.890 STATUS POST CATHETER ABLATION OF ATRIAL FLUTTER: ICD-10-CM

## 2018-04-26 DIAGNOSIS — Z86.79 S/P ABLATION OF ATRIAL FIBRILLATION: ICD-10-CM

## 2018-04-26 RX ORDER — SODIUM CHLORIDE 0.9 % (FLUSH) 0.9 %
5-10 SYRINGE (ML) INJECTION AS NEEDED
Status: CANCELLED | OUTPATIENT
Start: 2018-04-26

## 2018-04-26 RX ORDER — SODIUM CHLORIDE 0.9 % (FLUSH) 0.9 %
5-10 SYRINGE (ML) INJECTION EVERY 8 HOURS
Status: CANCELLED | OUTPATIENT
Start: 2018-04-26

## 2018-04-26 NOTE — PROGRESS NOTES
Cardiac Electrophysiology Office Note     Subjective:      Krystal Patrick is a 58 y.o.  male patient whom I had seen at [address] [place] [address] and cardioverted his atrial fibrillation in 10/2011. At the time he had small bowel surgery. In the past he has sinus bradycardia at rest and with exercise so he does not want to take medications every day   Reported history of stroke related to an air embolism. Was admitted at [address] [place] [address] for bowel obstruction at that time. Had a central line removed and within 2 minutes CP/SOB sx-found to be an air embolism. Had a 13 day hospital stay with this. Had left sided hemiparesis which resolved within a few days. Denied neuro residual deficits    He only takes metoprolol and flecainide prn (pill in the pocket)  He takes aspirin. He thinks that he has been in atrial fibrillation more frequent than he would like to believe. The patient does not have significant fatigue or palpitation symptoms because the rate is controlled. He had the calcium score done ordered by his primary care Dr. Alcides Saunders and told me that it is 0 score    Echo 2018 with normal LVEF  Mitral valve regurgitation is mild  LA size enlargement is 5 cm, increased with AFIB      Patient decided to undergo the catheter ablation for atrial fibrillation or flutter. This was done and he was good for about 3 days then he failed irregular heartbeats again when he was laying down. Since then he been in persistent atrial fibrillation. EKG today confirmed that. Other than that he does not have chest pain shortness of breath bleeding in the stool or urine and has no trouble with swallowing. There is no fever. After the ablation on April 6, 2018, the patient did not want medication except for the blood thinner. I kept him on the rate control medication. His concerns about use medication is bradycardia he may need the pacemaker.     The patient again feel the palpitation and EKG last week confirmed that he was out sinus rhythm. The patient was started on antiarrhythmic drug and he returned today with the same palpitation and EKG confirmed that he is in atypical atrial flutter with ventricular rate control nonspecific widening of the QRS complex and left axis deviation    Past Medical History:   Diagnosis Date    Atrial fibrillation with rapid ventricular response ([address] 10/15/2011    [address] by Dr. Howard Dozier 2011    DVT (deep venous thrombosis) ([address]     duirng 4/08 around time of bowel surgery;  and again in 2011 after bowel surgery;  coagulopathy workup was OK    Embolism ([address] 4/1/2008    s/p small bowel surgery, at time of catheter removal reduced EF immediately, improved to normal on subsequent echos    PAF (paroxysmal atrial fibrillation) ([address]     present 15 years, had [address] in 2011 during admission for SBO    Stroke ([address] 4/5/2008    air embolism after central line removal; s/p small bowel surgery    Ulcerative colitis     s/p total colectomy     Past Surgical History:   Procedure Laterality Date    CARDIOVERSION EXTERNAL  10/17/2011         HX COLECTOMY      total colectomy with IPAA for UC    HX LYSIS OF ADHESIONS      HX OTHER SURGICAL      Echo 10/11 - LVEF 55-60%, mild LAE, mild MR    HX SMALL BOWEL RESECTION      MA EPHYS EVL TRNSPTL TX ATRIAL FIB ISOLAT PULM VEIN  4/6/2018         MA ICAR CATHETER ABLATION ARRHYTHMIA ADD ON  4/6/2018          Allergies   Allergen Reactions    Cephalosporins Other (comments)     Intrahepatic cholestasis with cephalosporin, bilirubin valentina as high as 6. NOT AN ALLERGY, but rather an adverse reaction. He could likely tolerate a short course if the risk < benefit. Current Outpatient Prescriptions   Medication Sig    flecainide (TAMBOCOR) 100 mg tablet Take 1 Tab by mouth two (2) times a day.  dilTIAZem CD (CARDIZEM CD) 120 mg ER capsule Take 1 Cap by mouth daily.  apixaban (ELIQUIS) 5 mg tablet Take 1 Tab by mouth two (2) times a day.  Indications: PREVENT THROMBOEMBOLISM IN CHRONIC ATRIAL FIBRILLATION    multivitamin (ONE A DAY) tablet Take 1 Tab by mouth daily.  B INFANTIS/B ANI/B LUIS FERNANDO/B BIFID (PROBIOTIC 4X PO) Take  by mouth. No current facility-administered medications for this visit. SOCIAL  FT employed in sales, lots of travelling  , 2 children  Non-smoker  ETOH- social, 2 times a week, beer  Exercises 5 times a week, 35-45 minutes- cardio and light weights/swim/jog  Caffeine- 2 cups of coffee a day  Eats a heart healthy diet, Mediterranean      Review of Systems:   Constitutional: Negative for fever, chills, weight loss  HEENT: Negative for nosebleeds, vision changes. Respiratory: Negative for cough, hemoptysis, sputum production, and wheezing. Cardiovascular: Negative for chest pain, orthopnea, claudication, leg swelling, syncope, and PND. Gastrointestinal: Negative for nausea, vomiting, diarrhea, constipation, blood in stool and melena. Genitourinary: Negative for dysuria, and hematuria. Musculoskeletal: Negative for myalgias, arthralgia. Skin: Negative for rash. Heme: Does not bleed or bruise easily. Neurological: Negative for speech change and focal weakness     Objective:     Visit Vitals    /72 (BP 1 Location: Right arm, BP Patient Position: Sitting)    Pulse 89    Ht 6' 5\" (1.956 m)    Wt 223 lb (101.2 kg)    BMI 26.44 kg/m2      Physical Exam:   Constitutional: well-developed and well-nourished. No distress. Head: Normocephalic and atraumatic. Eyes: Pupils are equal, round  Neck: supple. No JVD present. Cardiovascular: Normal rate, irregular rhythm and normal heart sounds. Exam reveals no gallop and no friction rub. No murmur heard. Pulmonary/Chest: Effort normal and breath sounds normal. No wheezes. Abdominal: Soft, no tenderness. scar  Musculoskeletal: no edema. Neurological: alert,oriented. Skin: Skin is warm and dry  Psychiatric: normal mood and affect.  Behavior is normal. Judgment and thought content normal.       Twelve-lead EKG is in the HPI  Assessment/Plan:     Encounter Diagnoses   Name Primary?  Atypical atrial flutter (HCC) Yes    S/P ablation of atrial fibrillation     Status post catheter ablation of atrial flutter     Persistent atrial fibrillation (HCC)     Mitral valve insufficiency, unspecified etiology     Tricuspid valve insufficiency, unspecified etiology        I recommend him to continue with the Cardizem Eliquis and at the flecainide. In the past when I gave him the pill in the pocket he did not use it. We need to maintain normal sinus rhythm after the ablation. We discussed about electrical cardioversion. The patient agreed to proceed tomorrow morning    Thank you for involving me in this patient's care and please call with further concerns or questions. Yesenia Issa M.D.  [facility] [place]  Electrophysiology/Cardiology  [facility] [place]  [address], [place] [address] [facility], [place] [address], [address] [place] [address]  [phone]

## 2018-04-26 NOTE — MR AVS SNAPSHOT
[address] [facility] [address] [facility] 
[phone] Patient: Oni Quesada MRN: [id number] Visit Information Date & Time Provider Department Dept. Phone Encounter #  
 4/26/2018  8:00 AM Deep Miller MD [facility] Nidhi Daley [phone] [id number] Follow-up Instructions Return in about 3 months (around 7/26/2018). Follow-up and Disposition History Upcoming Health Maintenance Date Due DTaP/Tdap/Td series (1 - Tdap) 12/20/1976 FOBT Q 1 YEAR AGE 50-75 12/20/2005 ZOSTER VACCINE AGE 60> 10/20/2015 Influenza Age 5 to Adult 8/1/2017 Allergies as of 4/26/2018  Review Complete On: 4/19/2018 By: Deep Miller MD  
  
 Severity Noted Reaction Type Reactions Cephalosporins Medium 10/20/2011   Systemic Other (comments) Intrahepatic cholestasis with cephalosporin, bilirubin valentina as high as 6. NOT AN ALLERGY, but rather an adverse reaction. He could likely tolerate a short course if the risk < benefit. Current Immunizations  Never Reviewed No immunizations on file. Not reviewed this visit You Were Diagnosed With   
  
 Codes Comments Atypical atrial flutter (HCC)    -  Primary ICD-10-CM: I48.4 ICD-9-CM: 427.32 S/P ablation of atrial fibrillation     ICD-10-CM: Z98.890, Z86.79 
ICD-9-CM: V45.89 Status post catheter ablation of atrial flutter     ICD-10-CM: Z98.890 ICD-9-CM: V45.89 Persistent atrial fibrillation (HCC)     ICD-10-CM: I48.1 ICD-9-CM: 427.31 Mitral valve insufficiency, unspecified etiology     ICD-10-CM: I34.0 ICD-9-CM: 424.0 Tricuspid valve insufficiency, unspecified etiology     ICD-10-CM: I07.1 ICD-9-CM: 397.0 Vitals BP Pulse Height(growth percentile) Weight(growth percentile) BMI Smoking Status  110/72 (BP 1 Location: Right arm, BP Patient Position: Sitting) 89 6' 5\" (1.956 m) 223 lb (101.2 kg) 26.44 kg/m2 Never Smoker Vitals History BMI and BSA Data Body Mass Index Body Surface Area  
 26.44 kg/m 2 2.34 m 2 Preferred Pharmacy Pharmacy Name Phone [address], [address] [place] [address] [phone] Your Updated Medication List  
  
   
This list is accurate as of 4/26/18  8:28 AM.  Always use your most recent med list.  
  
  
  
  
 apixaban 5 mg tablet Commonly known as:  [place] Sheets Take 1 Tab by mouth two (2) times a day. Indications: PREVENT THROMBOEMBOLISM IN CHRONIC ATRIAL FIBRILLATION  
  
 dilTIAZem  mg ER capsule Commonly known as:  CARDIZEM CD Take 1 Cap by mouth daily. flecainide 100 mg tablet Commonly known as:  TAMBOCOR Take 1 Tab by mouth two (2) times a day. multivitamin tablet Commonly known as:  ONE A DAY Take 1 Tab by mouth daily. PROBIOTIC 4X PO Take  by mouth. We Performed the Following AMB POC EKG ROUTINE W/ 12 LEADS, INTER & REP [70867 CPT(R)] Follow-up Instructions Return in about 3 months (around 7/26/2018). Patient Instructions DCCV scheduled for 4/27/18 at 7:30am.  
 
 
  
Introducing [facility]! Jeffrey Duong introduces [facility] patient portal. Now you can access parts of your medical record, email your doctor's office, and request medication refills online. 1. In your internet browser, go to [email or web address] 2. Click on the First Time User? Click Here link in the Sign In box. You will see the New Member Sign Up page. 3. Enter your [facility] Access Code exactly as it appears below. You will not need to use this code after youve completed the sign-up process. If you do not sign up before the expiration date, you must request a new code. · [facility] Access Code: [email or web address] Expires: 5/30/2018  5:23 PM 
 
4.  Enter the last four digits of your Social Security Number (xxxx) and Date of Birth (mm/dd/yyyy) as indicated and click Submit. You will be taken to the next sign-up page. 5. Create a [employer]rement (HotelHotel) ID. This will be your [employer]) login ID and cannot be changed, so think of one that is secure and easy to remember. 6. Create a [employer] (HotelHotel) password. You can change your password at any time. 7. Enter your Password Reset Question and Answer. This can be used at a later time if you forget your password. 8. Enter your e-mail address. You will receive e-mail notification when new information is available in [address] 9. Click Sign Up. You can now view and download portions of your medical record. 10. Click the Download Summary menu link to download a portable copy of your medical information. If you have questions, please visit the Frequently Asked Questions section of the [employer] (HotelHotel) website. Remember, [employer] ([employer]el[employer]) is NOT to be used for urgent needs. For medical emergencies, dial 911. Now available from your iPhone and Android! Please provide this summary of care documentation to your next provider. Your primary care clinician is listed as Melina Henderson. If you have any questions after today's visit, please call [phone].

## 2018-04-27 ENCOUNTER — HOSPITAL ENCOUNTER (OUTPATIENT)
Dept: NON INVASIVE DIAGNOSTICS | Age: 63
Discharge: HOME | End: 2018-04-27
Attending: INTERNAL MEDICINE | Admitting: INTERNAL MEDICINE
Payer: COMMERCIAL

## 2018-04-27 VITALS
HEART RATE: 59 BPM | DIASTOLIC BLOOD PRESSURE: 85 MMHG | SYSTOLIC BLOOD PRESSURE: 118 MMHG | RESPIRATION RATE: 15 BRPM | HEIGHT: 77 IN | WEIGHT: 217.25 LBS | TEMPERATURE: 98 F | OXYGEN SATURATION: 98 % | BODY MASS INDEX: 25.65 KG/M2

## 2018-04-27 PROCEDURE — 77030039046 HC PAD DEFIB RADIOTRNSPNT CNMD -B

## 2018-04-27 PROCEDURE — 92960 CARDIOVERSION ELECTRIC EXT: CPT

## 2018-04-27 PROCEDURE — 74011250636 HC RX REV CODE- 250/636: Performed by: INTERNAL MEDICINE

## 2018-04-27 PROCEDURE — 93005 ELECTROCARDIOGRAM TRACING: CPT

## 2018-04-27 RX ORDER — MIDAZOLAM HYDROCHLORIDE 1 MG/ML
.5-1 INJECTION, SOLUTION INTRAMUSCULAR; INTRAVENOUS
Status: DISCONTINUED | OUTPATIENT
Start: 2018-04-27 | End: 2018-04-27 | Stop reason: HOSPADM

## 2018-04-27 RX ORDER — SODIUM CHLORIDE 0.9 % (FLUSH) 0.9 %
10 SYRINGE (ML) INJECTION AS NEEDED
Status: DISCONTINUED | OUTPATIENT
Start: 2018-04-27 | End: 2018-04-27 | Stop reason: HOSPADM

## 2018-04-27 RX ORDER — SODIUM CHLORIDE 0.9 % (FLUSH) 0.9 %
5-10 SYRINGE (ML) INJECTION AS NEEDED
Status: DISCONTINUED | OUTPATIENT
Start: 2018-04-27 | End: 2018-04-27 | Stop reason: HOSPADM

## 2018-04-27 RX ORDER — ATROPINE SULFATE 0.1 MG/ML
1 INJECTION INTRAVENOUS AS NEEDED
Status: DISCONTINUED | OUTPATIENT
Start: 2018-04-27 | End: 2018-04-27 | Stop reason: HOSPADM

## 2018-04-27 RX ORDER — FENTANYL CITRATE 50 UG/ML
25-200 INJECTION, SOLUTION INTRAMUSCULAR; INTRAVENOUS
Status: DISCONTINUED | OUTPATIENT
Start: 2018-04-27 | End: 2018-04-27 | Stop reason: HOSPADM

## 2018-04-27 RX ORDER — SODIUM CHLORIDE 9 MG/ML
25 INJECTION, SOLUTION INTRAVENOUS CONTINUOUS
Status: DISCONTINUED | OUTPATIENT
Start: 2018-04-27 | End: 2018-04-27 | Stop reason: HOSPADM

## 2018-04-27 RX ORDER — SODIUM CHLORIDE 0.9 % (FLUSH) 0.9 %
5-10 SYRINGE (ML) INJECTION EVERY 8 HOURS
Status: DISCONTINUED | OUTPATIENT
Start: 2018-04-27 | End: 2018-04-27 | Stop reason: HOSPADM

## 2018-04-27 RX ADMIN — MIDAZOLAM HYDROCHLORIDE 3 MG: 1 INJECTION, SOLUTION INTRAMUSCULAR; INTRAVENOUS at 08:07

## 2018-04-27 RX ADMIN — FENTANYL CITRATE 50 MCG: 50 INJECTION, SOLUTION INTRAMUSCULAR; INTRAVENOUS at 08:01

## 2018-04-27 RX ADMIN — Medication 10 ML: at 07:59

## 2018-04-27 RX ADMIN — MIDAZOLAM HYDROCHLORIDE 2 MG: 1 INJECTION, SOLUTION INTRAMUSCULAR; INTRAVENOUS at 08:05

## 2018-04-27 RX ADMIN — FENTANYL CITRATE 50 MCG: 50 INJECTION, SOLUTION INTRAMUSCULAR; INTRAVENOUS at 08:07

## 2018-04-27 RX ADMIN — SODIUM CHLORIDE 25 ML/HR: 900 INJECTION, SOLUTION INTRAVENOUS at 07:54

## 2018-04-27 RX ADMIN — MIDAZOLAM HYDROCHLORIDE 3 MG: 1 INJECTION, SOLUTION INTRAMUSCULAR; INTRAVENOUS at 08:01

## 2018-04-27 NOTE — IP AVS SNAPSHOT
[address] [facility] [address] [place] [address] 
[phone] Patient: Alona Pro MRN: [id number] [id number] About your hospitalization You were admitted on:  April 27, 2018 You last received care in the:  [address] [place] [address] You were discharged on:  April 27, 2018 Why you were hospitalized Your primary diagnosis was:  Not on File Your diagnoses also included:  Persistent Atrial Fibrillation (Hcc) Follow-up Information Follow up With Details Comments Contact Info Carmina Marie MD   [address] [place] [address] 
[phone] Your Scheduled Appointments Thursday May 24, 2018  8:00 AM EDT  
ESTABLISHED PATIENT with Arti Yancey MD  
[facility] ([facility]) [address] [place] [address]  [address] [place] [address]  
[phone] Discharge Orders None A check camille indicates which time of day the medication should be taken. My Medications CONTINUE taking these medications Instructions Each Dose to Equal  
 Morning Noon Evening Bedtime  
 apixaban 5 mg tablet Commonly known as:  Pattricia Boom Your last dose was: Your next dose is: Take 1 Tab by mouth two (2) times a day. Indications: PREVENT THROMBOEMBOLISM IN CHRONIC ATRIAL FIBRILLATION  
 5 mg  
    
   
   
   
  
 dilTIAZem  mg ER capsule Commonly known as:  CARDIZEM CD Your last dose was: Your next dose is: Take 1 Cap by mouth daily. 120 mg  
    
   
   
   
  
 flecainide 100 mg tablet Commonly known as:  TAMBOCOR Your last dose was: Your next dose is: Take 1 Tab by mouth two (2) times a day. 100 mg  
    
   
   
   
  
 multivitamin tablet Commonly known as:  ONE A DAY Your last dose was: Your next dose is: Take 1 Tab by mouth daily. 1 Tab PROBIOTIC 4X PO Your last dose was: Your next dose is: Take  by mouth. Discharge Instructions 1. Do not drive today due to sedation that you received. 2.  Continue all medications as previously prescribed. 3.  Follow up with Dr. Janki Kngiht in clinic as noted below. 4.  Call if you believe that you have atrial fibrillation or atrial flutter that lasts longer than a few hours. Future Appointments Date Time Provider Maryam Pascali 5/24/2018 8:00 AM Ana Jauregui  [address] Electrical Cardioversion: What to Expect at [facility] Your Recovery Electrical cardioversion is a treatment for an abnormal heartbeat, such as atrial fibrillation, supraventricular tachycardia, or ventricular tachycardia (VT). It uses a brief electrical shock to reset your heart's rhythm. After cardioversion, you may have redness, like a sunburn, where the patches were. The medicines you got to make you sleepy may make you feel drowsy for the rest of the day. Your doctor may have you take medicines to help the heart beat normally and to prevent blood clots. This care sheet gives you a general idea about how long it will take for you to recover. But each person recovers at a different pace. Follow the steps below to feel better as quickly as possible. How can you care for yourself at home? Medicines ? · Be safe with medicines. Take your medicines exactly as prescribed. Call your doctor if you think you are having a problem with your medicine. You may take one or more of the following medicines: 
¨ Rate-control medicines to slow the heart rate. These include beta-blockers, calcium channel blockers, and digoxin. ¨ Rhythm control medicines that help the heart keep a normal rhythm. ¨ Blood thinners, also called anticoagulants, which help prevent blood clots.  
You will get more details on the specific medicines your doctor prescribes. Be sure you know how to take your medicines safely. ? · Do not take any vitamins, over-the-counter medicines, or herbal products without talking to your doctor first.  
Exercise ? · Start light exercise if your doctor says that it is okay. Even a small amount will help you get stronger, have more energy, and manage your stress. Walking is an easy way to get exercise. Start out by walking a little more than you did in the hospital. Bit by bit, increase the amount you walk. ? · When you exercise, watch for signs that your heart is working too hard. You are pushing too hard if you cannot talk while you are exercising. If you become short of breath or dizzy or have chest pain, sit down and rest right away. ? · Check your pulse regularly. Place two fingers on the artery at the palm side of your wrist in line with your thumb. If your heartbeat seems uneven or fast, talk to your doctor. Other instructions ? · Ask your doctor when you can drive again. ? · Do not smoke. If you need help quitting, talk to your doctor about stop-smoking programs and medicines. These can increase your chances of quitting for good. ? · Limit alcohol. Follow-up care is a key part of your treatment and safety. Be sure to make and go to all appointments, and call your doctor if you are having problems. It's also a good idea to know your test results and keep a list of the medicines you take. When should you call for help? Call 911 anytime you think you may need emergency care. For example, call if: 
? · You passed out (lost consciousness). ? · You have chest pain or pressure. This may occur with: ¨ Sweating. ¨ Shortness of breath. ¨ Nausea or vomiting. ¨ Pain that spreads from the chest to the neck, jaw, or one or both shoulders or arms. ¨ A fast or uneven pulse. After calling 911, the  may tell you to chew 1 adult-strength or 2 to 4 low-dose aspirin. Wait for an ambulance.  Do not try to drive yourself. ? · You have symptoms of a stroke. These may include: 
¨ Sudden numbness, tingling, weakness, or loss of movement in your face, arm, or leg, especially on teressa side of your body. ¨ Sudden vision changes. ¨ Sudden trouble speaking. ¨ Sudden confusion or trouble understanding simple statements. ¨ Sudden problems with walking or balance. ¨ A sudden, severe headache that is different from past headaches. ?Call your doctor now or seek immediate medical care if: 
? · You feel dizzy or lightheaded, or you feel like you may faint. ? · You have a fast or irregular heartbeat. ? Watch closely for any changes in your health, and be sure to contact your doctor if you have any problems. Where can you learn more? Go to http://sofía-ignacia.info/. Enter A617 in the search box to learn more about \"Electrical Cardioversion: What to Expect at Home. \" Current as of: September 21, 2016 Content Version: 11.4 © 9789-6239 [employer]. Care instructions adapted under license by [employer] (which disclaims liability or warranty for this information). If you have questions about a medical condition or this instruction, always ask your healthcare professional. [place] [address] any warranty or liability for your use of this information. Electrical Cardioversion: What to Expect at [facility] Your Recovery Electrical cardioversion is a treatment for an abnormal heartbeat, such as atrial fibrillation, supraventricular tachycardia, or ventricular tachycardia (VT). It uses a brief electrical shock to reset your heart's rhythm. After cardioversion, you may have redness, like a sunburn, where the patches were. The medicines you got to make you sleepy may make you feel drowsy for the rest of the day. Your doctor may have you take medicines to help the heart beat normally and to prevent blood clots. This care sheet gives you a general idea about how long it will take for you to recover. But each person recovers at a different pace. Follow the steps below to feel better as quickly as possible. How can you care for yourself at home? Medicines ? · Be safe with medicines. Take your medicines exactly as prescribed. Call your doctor if you think you are having a problem with your medicine. You may take one or more of the following medicines: 
¨ Rate-control medicines to slow the heart rate. These include beta-blockers, calcium channel blockers, and digoxin. ¨ Rhythm control medicines that help the heart keep a normal rhythm. ¨ Blood thinners, also called anticoagulants, which help prevent blood clots. You will get more details on the specific medicines your doctor prescribes. Be sure you know how to take your medicines safely. ? · Do not take any vitamins, over-the-counter medicines, or herbal products without talking to your doctor first.  
Exercise ? · Start light exercise if your doctor says that it is okay. Even a small amount will help you get stronger, have more energy, and manage your stress. Walking is an easy way to get exercise. Start out by walking a little more than you did in the hospital. Bit by bit, increase the amount you walk. ? · When you exercise, watch for signs that your heart is working too hard. You are pushing too hard if you cannot talk while you are exercising. If you become short of breath or dizzy or have chest pain, sit down and rest right away. ? · Check your pulse regularly. Place two fingers on the artery at the palm side of your wrist in line with your thumb. If your heartbeat seems uneven or fast, talk to your doctor. Other instructions ? · Ask your doctor when you can drive again. ? · Do not smoke. If you need help quitting, talk to your doctor about stop-smoking programs and medicines. These can increase your chances of quitting for good. ? · Limit alcohol. Follow-up care is a key part of your treatment and safety. Be sure to make and go to all appointments, and call your doctor if you are having problems. It's also a good idea to know your test results and keep a list of the medicines you take. When should you call for help? Call 911 anytime you think you may need emergency care. For example, call if: 
? · You passed out (lost consciousness). ? · You have chest pain or pressure. This may occur with: ¨ Sweating. ¨ Shortness of breath. ¨ Nausea or vomiting. ¨ Pain that spreads from the chest to the neck, jaw, or one or both shoulders or arms. ¨ A fast or uneven pulse. After calling 911, the  may tell you to chew 1 adult-strength or 2 to 4 low-dose aspirin. Wait for an ambulance. Do not try to drive yourself. ? · You have symptoms of a stroke. These may include: 
¨ Sudden numbness, tingling, weakness, or loss of movement in your face, arm, or leg, especially on teressa side of your body. ¨ Sudden vision changes. ¨ Sudden trouble speaking. ¨ Sudden confusion or trouble understanding simple statements. ¨ Sudden problems with walking or balance. ¨ A sudden, severe headache that is different from past headaches. ?Call your doctor now or seek immediate medical care if: 
? · You feel dizzy or lightheaded, or you feel like you may faint. ? · You have a fast or irregular heartbeat. ? Watch closely for any changes in your health, and be sure to contact your doctor if you have any problems. Where can you learn more? Go to http://sofía-ignacia.info/. Enter A617 in the search box to learn more about \"Electrical Cardioversion: What to Expect at Home. \" Current as of: September 21, 2016 Content Version: 11.4 © 3889-1749 [employer]. Care instructions adapted under license by [employer] (which disclaims liability or warranty for this information).  If you have questions about a medical condition or this instruction, always ask your healthcare professional. [place] [address] any warranty or liability for your use of this information. Introducing [facility]! Andrew Ocasio introduces Videum patient portal. Now you can access parts of your medical record, email your doctor's office, and request medication refills online. 1. In your internet browser, go to [email or web address] 2. Click on the First Time User? Click Here link in the Sign In box. You will see the New Member Sign Up page. 3. Enter your Videum Access Code exactly as it appears below. You will not need to use this code after youve completed the sign-up process. If you do not sign up before the expiration date, you must request a new code. · Videum Access Code: [email or web address] Expires: 5/30/2018  5:23 PM 
 
4. Enter the last four digits of your Social Security Number (xxxx) and Date of Birth (mm/dd/yyyy) as indicated and click Submit. You will be taken to the next sign-up page. 5. Create a Videum ID. This will be your Videum login ID and cannot be changed, so think of one that is secure and easy to remember. 6. Create a Videum password. You can change your password at any time. 7. Enter your Password Reset Question and Answer. This can be used at a later time if you forget your password. 8. Enter your e-mail address. You will receive e-mail notification when new information is available in [address] 9. Click Sign Up. You can now view and download portions of your medical record. 10. Click the Download Summary menu link to download a portable copy of your medical information. If you have questions, please visit the Frequently Asked Questions section of the Videum website. Remember, Videum is NOT to be used for urgent needs. For medical emergencies, dial 911. Now available from your iPhone and Android! Introducing Wayne Guzman As a Jody Krzysztof patient, I wanted to make you aware of our electronic visit tool called Wayne EduardoMOLI. Jodyministerio Blankenship 24/7 allows you to connect within minutes with a medical provider 24 hours a day, seven days a week via a mobile device or tablet or logging into a secure website from your computer. You can access Wayne Eduardofin from anywhere in the [place]. A virtual visit might be right for you when you have a simple condition and feel like you just dont want to get out of bed, or cant get away from work for an appointment, when your regular Jody Blankenship provider is not available (evenings, weekends or holidays), or when youre out of town and need minor care. Electronic visits cost only $49 and if the Jody Blankenship 24/7 provider determines a prescription is needed to treat your condition, one can be electronically transmitted to a nearby pharmacy*. Please take a moment to enroll today if you have not already done so. The enrollment process is free and takes just a few minutes. To enroll, please download the MiTÃº 24/7 yobani to your tablet or phone, or visit [email or web address] to enroll on your computer. And, as an [address] [place] [address] patient with a [employer] account, the results of your visits will be scanned into your electronic medical record and your primary care provider will be able to view the scanned results. We urge you to continue to see your regular Jody Blankenship provider for your ongoing medical care. And while your primary care provider may not be the one available when you seek a Wayne Rosaliotroyfin virtual visit, the peace of mind you get from getting a real diagnosis real time can be priceless. For more information on Wayne Rosaliotroyfin, view our Frequently Asked Questions (FAQs) at [email or web address]. Sincerely, 
 
Sophia Tim MD 
Chief Medical Officer Blu Padilla *:  certain medications cannot be prescribed via Wayne Guzman Unresulted Labs-Please follow up with your PCP about these lab tests Order Current Status EKG, 12 LEAD, INITIAL Preliminary result Providers Seen During Your Hospitalization Provider Specialty Primary office phone Be Rose MD Cardiology [phone] Your Primary Care Physician (PCP) Primary Care Physician Office Phone Office Fax Maurice Maire  You are allergic to the following Allergen Reactions Cephalosporins Other (comments) Intrahepatic cholestasis with cephalosporin, bilirubin valentina as high as 6. NOT AN ALLERGY, but rather an adverse reaction. He could likely tolerate a short course if the risk < benefit. Recent Documentation Height Weight BMI Smoking Status 1.956 m 98.5 kg 25.76 kg/m2 Never Smoker Emergency Contacts Name Discharge Info Relation Home Work Mobile Ashlyn Vines DISCHARGE CAREGIVER [3] Spouse [3] [phone] Patient Belongings The following personal items are in your possession at time of discharge: 
     Visual Aid: Glasses Please provide this summary of care documentation to your next provider. Signatures-by signing, you are acknowledging that this After Visit Summary has been reviewed with you and you have received a copy. Patient Signature:  ____________________________________________________________ Date:  ____________________________________________________________  
  
Shar Malave Provider Signature:  ____________________________________________________________ Date:  ____________________________________________________________

## 2018-04-27 NOTE — INTERVAL H&P NOTE
H&P Update:  Vernestine Form was seen and examined. History and physical has been reviewed. The patient has been examined.  There have been no significant clinical changes since the completion of the originally dated History and Physical.    Signed By: Renée Fulton MD     April 27, 2018 7:39 AM

## 2018-04-27 NOTE — H&P (VIEW-ONLY)
Cardiac Electrophysiology Office Note     Subjective:      Hollis Beavers is a 58 y.o.  male patient whom I had seen at [address] [place] [address] and cardioverted his atrial fibrillation in 10/2011. At the time he had small bowel surgery. In the past he has sinus bradycardia at rest and with exercise so he does not want to take medications every day   Reported history of stroke related to an air embolism. Was admitted at [address] [place] [address] for bowel obstruction at that time. Had a central line removed and within 2 minutes CP/SOB sx-found to be an air embolism. Had a 13 day hospital stay with this. Had left sided hemiparesis which resolved within a few days. Denied neuro residual deficits    He only takes metoprolol and flecainide prn (pill in the pocket)  He takes aspirin. He thinks that he has been in atrial fibrillation more frequent than he would like to believe. The patient does not have significant fatigue or palpitation symptoms because the rate is controlled. He had the calcium score done ordered by his primary care Dr. Everette Samuel and told me that it is 0 score    Echo 2018 with normal LVEF  Mitral valve regurgitation is mild  LA size enlargement is 5 cm, increased with AFIB      Patient decided to undergo the catheter ablation for atrial fibrillation or flutter. This was done and he was good for about 3 days then he failed irregular heartbeats again when he was laying down. Since then he been in persistent atrial fibrillation. EKG today confirmed that. Other than that he does not have chest pain shortness of breath bleeding in the stool or urine and has no trouble with swallowing. There is no fever. After the ablation on April 6, 2018, the patient did not want medication except for the blood thinner. I kept him on the rate control medication. His concerns about use medication is bradycardia he may need the pacemaker.     The patient again feel the palpitation and EKG last week confirmed that he was out sinus rhythm. The patient was started on antiarrhythmic drug and he returned today with the same palpitation and EKG confirmed that he is in atypical atrial flutter with ventricular rate control nonspecific widening of the QRS complex and left axis deviation    Past Medical History:   Diagnosis Date    Atrial fibrillation with rapid ventricular response ([address] 10/15/2011    [address] by Dr. Francois Aviles 2011    DVT (deep venous thrombosis) ([address]     duirng 4/08 around time of bowel surgery;  and again in 2011 after bowel surgery;  coagulopathy workup was OK    Embolism ([address] 4/1/2008    s/p small bowel surgery, at time of catheter removal reduced EF immediately, improved to normal on subsequent echos    PAF (paroxysmal atrial fibrillation) ([address]     present 15 years, had [address] in 2011 during admission for SBO    Stroke ([address] 4/5/2008    air embolism after central line removal; s/p small bowel surgery    Ulcerative colitis     s/p total colectomy     Past Surgical History:   Procedure Laterality Date    CARDIOVERSION EXTERNAL  10/17/2011         HX COLECTOMY      total colectomy with IPAA for UC    HX LYSIS OF ADHESIONS      HX OTHER SURGICAL      Echo 10/11 - LVEF 55-60%, mild LAE, mild MR    HX SMALL BOWEL RESECTION      [place] EPHYS EVL TRNSPTL TX ATRIAL FIB ISOLAT PULM VEIN  4/6/2018         [place] ICAR CATHETER ABLATION ARRHYTHMIA ADD ON  4/6/2018          Allergies   Allergen Reactions    Cephalosporins Other (comments)     Intrahepatic cholestasis with cephalosporin, bilirubin valentina as high as 6. NOT AN ALLERGY, but rather an adverse reaction. He could likely tolerate a short course if the risk < benefit. Current Outpatient Prescriptions   Medication Sig    flecainide (TAMBOCOR) 100 mg tablet Take 1 Tab by mouth two (2) times a day.  dilTIAZem CD (CARDIZEM CD) 120 mg ER capsule Take 1 Cap by mouth daily.  apixaban (ELIQUIS) 5 mg tablet Take 1 Tab by mouth two (2) times a day.  Indications: PREVENT THROMBOEMBOLISM IN CHRONIC ATRIAL FIBRILLATION    multivitamin (ONE A DAY) tablet Take 1 Tab by mouth daily.  B INFANTIS/B ANI/B LUIS FERNANDO/B BIFID (PROBIOTIC 4X PO) Take  by mouth. No current facility-administered medications for this visit. SOCIAL  FT employed in sales, lots of travelling  , 2 children  Non-smoker  ETOH- social, 2 times a week, beer  Exercises 5 times a week, 35-45 minutes- cardio and light weights/swim/jog  Caffeine- 2 cups of coffee a day  Eats a heart healthy diet, Mediterranean      Review of Systems:   Constitutional: Negative for fever, chills, weight loss  HEENT: Negative for nosebleeds, vision changes. Respiratory: Negative for cough, hemoptysis, sputum production, and wheezing. Cardiovascular: Negative for chest pain, orthopnea, claudication, leg swelling, syncope, and PND. Gastrointestinal: Negative for nausea, vomiting, diarrhea, constipation, blood in stool and melena. Genitourinary: Negative for dysuria, and hematuria. Musculoskeletal: Negative for myalgias, arthralgia. Skin: Negative for rash. Heme: Does not bleed or bruise easily. Neurological: Negative for speech change and focal weakness     Objective:     Visit Vitals    /72 (BP 1 Location: Right arm, BP Patient Position: Sitting)    Pulse 89    Ht 6' 5\" (1.956 m)    Wt 223 lb (101.2 kg)    BMI 26.44 kg/m2      Physical Exam:   Constitutional: well-developed and well-nourished. No distress. Head: Normocephalic and atraumatic. Eyes: Pupils are equal, round  Neck: supple. No JVD present. Cardiovascular: Normal rate, irregular rhythm and normal heart sounds. Exam reveals no gallop and no friction rub. No murmur heard. Pulmonary/Chest: Effort normal and breath sounds normal. No wheezes. Abdominal: Soft, no tenderness. scar  Musculoskeletal: no edema. Neurological: alert,oriented. Skin: Skin is warm and dry  Psychiatric: normal mood and affect.  Behavior is normal. Judgment and thought content normal.       Twelve-lead EKG is in the HPI  Assessment/Plan:     Encounter Diagnoses   Name Primary?  Atypical atrial flutter (HCC) Yes    S/P ablation of atrial fibrillation     Status post catheter ablation of atrial flutter     Persistent atrial fibrillation (HCC)     Mitral valve insufficiency, unspecified etiology     Tricuspid valve insufficiency, unspecified etiology        I recommend him to continue with the Cardizem Eliquis and at the flecainide. In the past when I gave him the pill in the pocket he did not use it. We need to maintain normal sinus rhythm after the ablation. We discussed about electrical cardioversion. The patient agreed to proceed tomorrow morning    Thank you for involving me in this patient's care and please call with further concerns or questions. Harper Vivas M.D.  [facility]  Electrophysiology/Cardiology  [facility]  [address], [address] , [address], [address]                             [address] [place] [address]  [phone]

## 2018-04-27 NOTE — ROUTINE PROCESS
Cardiac Cath Lab Recovery Arrival Note:      Margarita Molina arrived to Cardiac Cath Lab, Recovery Area. Staff introduced to patient. Patient identifiers verified with NAME and DATE OF BIRTH. Procedure verified with patient. Consent forms reviewed and signed by patient or authorized representative and verified. Allergies verified. Patient and family oriented to department. Patient and family informed of procedure and plan of care. Questions answered with review. Patient prepped for procedure, per orders from physician, prior to arrival.    Patient on cardiac monitor, non-invasive blood pressure, SPO2 monitor. On RA. Patient is A&Ox 4. Patient reports no pain. Patient in stretcher, in low position, with side rails up, call bell within reach, patient instructed to call if assistance as needed. Patient prep in: [address], [place] [address]. Patient family has pager #   Family in: . Prep by: HARMAN Akins RN

## 2018-04-27 NOTE — PROCEDURES
CARDIOVERSION  Procedure date: 4/27/2018    PROCEDURE:   Electrical synchronized cardioversion of atrial fibrillation/flutter. BRIEF HISTORY:  This is a 58 y. o.man with the history of persistent atrial fibrillation who underwent ablation of atrial fibrillation and atrial flutter and had recurrence with atypical atrial flutter. He has palpitation and has been on medications including eliquis. He is recommended cardioversion. The risks and benefits have been discussed with him and he agreed to proceed. PROCEDURE IN DETAIL:   The patient is now in the supine position and the pads were applied in the anterior posterior direction. He was given total 8 mg Versed,100 mcg of fentanyl. Thereafter the conscious sedation was maintained and 200 joule biphasic shock was delivered in anterior posterior direction and converted to sinus rhythm . The patient was then arousable. COMPLICATIONS:  None.     Specimen removed: NONE  ASSESSMENT AND PLAN:  The patient will be discharged home with his wife and follow up in office

## 2018-04-27 NOTE — DISCHARGE INSTRUCTIONS
1.  Do not drive today due to sedation that you received. 2.  Continue all medications as previously prescribed. 3.  Follow up with Dr. Leo Wilson in clinic as noted below. 4.  Call if you believe that you have atrial fibrillation or atrial flutter that lasts longer than a few hours. Future Appointments  Date Time Provider Maryam Hines   5/24/2018 8:00 AM Sly Thomas  [address]                    Electrical Cardioversion: What to Expect at [facility]    Electrical cardioversion is a treatment for an abnormal heartbeat, such as atrial fibrillation, supraventricular tachycardia, or ventricular tachycardia (VT). It uses a brief electrical shock to reset your heart's rhythm. After cardioversion, you may have redness, like a sunburn, where the patches were. The medicines you got to make you sleepy may make you feel drowsy for the rest of the day. Your doctor may have you take medicines to help the heart beat normally and to prevent blood clots. This care sheet gives you a general idea about how long it will take for you to recover. But each person recovers at a different pace. Follow the steps below to feel better as quickly as possible. How can you care for yourself at home? Medicines  ? · Be safe with medicines. Take your medicines exactly as prescribed. Call your doctor if you think you are having a problem with your medicine. You may take one or more of the following medicines:  ¨ Rate-control medicines to slow the heart rate. These include beta-blockers, calcium channel blockers, and digoxin. ¨ Rhythm control medicines that help the heart keep a normal rhythm. ¨ Blood thinners, also called anticoagulants, which help prevent blood clots. You will get more details on the specific medicines your doctor prescribes. Be sure you know how to take your medicines safely.    ? · Do not take any vitamins, over-the-counter medicines, or herbal products without talking to your doctor first. Exercise  ? · Start light exercise if your doctor says that it is okay. Even a small amount will help you get stronger, have more energy, and manage your stress. Walking is an easy way to get exercise. Start out by walking a little more than you did in the hospital. Bit by bit, increase the amount you walk. ? · When you exercise, watch for signs that your heart is working too hard. You are pushing too hard if you cannot talk while you are exercising. If you become short of breath or dizzy or have chest pain, sit down and rest right away. ? · Check your pulse regularly. Place two fingers on the artery at the palm side of your wrist in line with your thumb. If your heartbeat seems uneven or fast, talk to your doctor. Other instructions  ? · Ask your doctor when you can drive again. ? · Do not smoke. If you need help quitting, talk to your doctor about stop-smoking programs and medicines. These can increase your chances of quitting for good. ? · Limit alcohol. Follow-up care is a key part of your treatment and safety. Be sure to make and go to all appointments, and call your doctor if you are having problems. It's also a good idea to know your test results and keep a list of the medicines you take. When should you call for help? Call 911 anytime you think you may need emergency care. For example, call if:  ? · You passed out (lost consciousness). ? · You have chest pain or pressure. This may occur with:  ¨ Sweating. ¨ Shortness of breath. ¨ Nausea or vomiting. ¨ Pain that spreads from the chest to the neck, jaw, or one or both shoulders or arms. ¨ A fast or uneven pulse. After calling 911, the  may tell you to chew 1 adult-strength or 2 to 4 low-dose aspirin. Wait for an ambulance. Do not try to drive yourself. ? · You have symptoms of a stroke.  These may include:  ¨ Sudden numbness, tingling, weakness, or loss of movement in your face, arm, or leg, especially on teressa side of your body.  ¨ Sudden vision changes. ¨ Sudden trouble speaking. ¨ Sudden confusion or trouble understanding simple statements. ¨ Sudden problems with walking or balance. ¨ A sudden, severe headache that is different from past headaches. ?Call your doctor now or seek immediate medical care if:  ? · You feel dizzy or lightheaded, or you feel like you may faint. ? · You have a fast or irregular heartbeat. ? Watch closely for any changes in your health, and be sure to contact your doctor if you have any problems. Where can you learn more? Go to http://sofía-ignacia.info/. Enter A617 in the search box to learn more about \"Electrical Cardioversion: What to Expect at Home. \"  Current as of: September 21, 2016  Content Version: 11.4  © 8804-5982 [employer]. Care instructions adapted under license by [employer] (which disclaims liability or warranty for this information). If you have questions about a medical condition or this instruction, always ask your healthcare professional. [place] [address] any warranty or liability for your use of this information. Electrical Cardioversion: What to Expect at Home  Your Recovery    Electrical cardioversion is a treatment for an abnormal heartbeat, such as atrial fibrillation, supraventricular tachycardia, or ventricular tachycardia (VT). It uses a brief electrical shock to reset your heart's rhythm. After cardioversion, you may have redness, like a sunburn, where the patches were. The medicines you got to make you sleepy may make you feel drowsy for the rest of the day. Your doctor may have you take medicines to help the heart beat normally and to prevent blood clots. This care sheet gives you a general idea about how long it will take for you to recover. But each person recovers at a different pace. Follow the steps below to feel better as quickly as possible.   How can you care for yourself at home?  Medicines  ? · Be safe with medicines. Take your medicines exactly as prescribed. Call your doctor if you think you are having a problem with your medicine. You may take one or more of the following medicines:  ¨ Rate-control medicines to slow the heart rate. These include beta-blockers, calcium channel blockers, and digoxin. ¨ Rhythm control medicines that help the heart keep a normal rhythm. ¨ Blood thinners, also called anticoagulants, which help prevent blood clots. You will get more details on the specific medicines your doctor prescribes. Be sure you know how to take your medicines safely. ? · Do not take any vitamins, over-the-counter medicines, or herbal products without talking to your doctor first.   Exercise  ? · Start light exercise if your doctor says that it is okay. Even a small amount will help you get stronger, have more energy, and manage your stress. Walking is an easy way to get exercise. Start out by walking a little more than you did in the hospital. Bit by bit, increase the amount you walk. ? · When you exercise, watch for signs that your heart is working too hard. You are pushing too hard if you cannot talk while you are exercising. If you become short of breath or dizzy or have chest pain, sit down and rest right away. ? · Check your pulse regularly. Place two fingers on the artery at the palm side of your wrist in line with your thumb. If your heartbeat seems uneven or fast, talk to your doctor. Other instructions  ? · Ask your doctor when you can drive again. ? · Do not smoke. If you need help quitting, talk to your doctor about stop-smoking programs and medicines. These can increase your chances of quitting for good. ? · Limit alcohol. Follow-up care is a key part of your treatment and safety. Be sure to make and go to all appointments, and call your doctor if you are having problems.  It's also a good idea to know your test results and keep a list of the medicines you take. When should you call for help? Call 911 anytime you think you may need emergency care. For example, call if:  ? · You passed out (lost consciousness). ? · You have chest pain or pressure. This may occur with:  ¨ Sweating. ¨ Shortness of breath. ¨ Nausea or vomiting. ¨ Pain that spreads from the chest to the neck, jaw, or one or both shoulders or arms. ¨ A fast or uneven pulse. After calling 911, the  may tell you to chew 1 adult-strength or 2 to 4 low-dose aspirin. Wait for an ambulance. Do not try to drive yourself. ? · You have symptoms of a stroke. These may include:  ¨ Sudden numbness, tingling, weakness, or loss of movement in your face, arm, or leg, especially on teressa side of your body. ¨ Sudden vision changes. ¨ Sudden trouble speaking. ¨ Sudden confusion or trouble understanding simple statements. ¨ Sudden problems with walking or balance. ¨ A sudden, severe headache that is different from past headaches. ?Call your doctor now or seek immediate medical care if:  ? · You feel dizzy or lightheaded, or you feel like you may faint. ? · You have a fast or irregular heartbeat. ? Watch closely for any changes in your health, and be sure to contact your doctor if you have any problems. Where can you learn more? Go to http://sofía-ignacia.info/. Enter A617 in the search box to learn more about \"Electrical Cardioversion: What to Expect at Home. \"  Current as of: September 21, 2016  Content Version: 11.4  © 9172-2930 [employer]. Care instructions adapted under license by [employer] (which disclaims liability or warranty for this information). If you have questions about a medical condition or this instruction, always ask your healthcare professional. [place] [address] any warranty or liability for your use of this information.

## 2018-04-27 NOTE — PROGRESS NOTES
TRANSFER - IN REPORT:    Verbal report received from [facility]. on Severino Mckeon  being received from cath lab procedure area  for routine progression of care. Report consisted of patients Situation, Background, Assessment and Recommendations(SBAR). Information from the following report(s) Kardex and Procedure Summary was reviewed with the receiving clinician. Opportunity for questions and clarification was provided. Assessment completed upon patients arrival to [address] [place] [address] and care assumed. Cardiac Cath Lab Recovery Arrival Note:    Severino Mckeon arrived to [facility] recovery area. Patient procedure= CV. Patient on cardiac monitor, non-invasive blood pressure, SPO2 monitor. On O2 @ 2 lpm via NC.  IV  of NS on pump at 25 ml/hr. Patient status doing well without problems. Patient is A&Ox 3. Patient reports no pain. PROCEDURE SITE CHECK:    Procedure site:  none     No change in patient status. Continue to monitor patient and status.

## 2018-04-27 NOTE — PROGRESS NOTES
Cardiac Cath Lab Procedure Area Arrival Note:    Yonatan Bernardo arrived to Cardiac Cath Lab, Procedure Area. Patient identifiers verified with NAME and DATE OF BIRTH. Procedure verified with patient. Consent forms verified. Allergies verified. Patient informed of procedure and plan of care. Questions answered with review. Patient voiced understanding of procedure and plan of care. Patient on cardiac monitor, non-invasive blood pressure, SPO2 monitor. On   or O2 @ 2 lpm via NC.  IV of NS on pump at 25 ml/hr. Patient status doing well without problems. Patient is A&Ox 4. Patient reports no pain. Patient medicated during procedure with orders obtained and verified by Dr. Kedar Leung. Refer to patients Cardiac Cath Lab PROCEDURE REPORT for vital signs, assessment, status, and response during procedure, printed at end of case. Printed report on chart or scanned into chart. 4203:  TRANSFER - OUT REPORT:    Verbal report given to Raina Blevins RN on Yonatan Bernardo being transferred to  for routine progression of care       Report consisted of patients Situation, Background, Assessment and   Recommendations(SBAR). Information from the following report(s) Procedure Summary and MAR was reviewed with the receiving nurse. Opportunity for questions and clarification was provided.

## 2018-04-27 NOTE — IP AVS SNAPSHOT
[address] [place] [address] 
[phone] Patient: Margarita Molina MRN: [id number] HPQ:[id number] A check camille indicates which time of day the medication should be taken. My Medications CONTINUE taking these medications Instructions Each Dose to Equal  
 Morning Noon Evening Bedtime  
 apixaban 5 mg tablet Commonly known as:  Jose Partida Your last dose was: Your next dose is: Take 1 Tab by mouth two (2) times a day. Indications: PREVENT THROMBOEMBOLISM IN CHRONIC ATRIAL FIBRILLATION  
 5 mg  
    
   
   
   
  
 dilTIAZem  mg ER capsule Commonly known as:  CARDIZEM CD Your last dose was: Your next dose is: Take 1 Cap by mouth daily. 120 mg  
    
   
   
   
  
 flecainide 100 mg tablet Commonly known as:  TAMBOCOR Your last dose was: Your next dose is: Take 1 Tab by mouth two (2) times a day. 100 mg  
    
   
   
   
  
 multivitamin tablet Commonly known as:  ONE A DAY Your last dose was: Your next dose is: Take 1 Tab by mouth daily. 1 Tab PROBIOTIC 4X PO Your last dose was: Your next dose is: Take  by mouth.

## 2018-04-28 LAB
ATRIAL RATE: 56 BPM
CALCULATED P AXIS, ECG09: 34 DEGREES
CALCULATED R AXIS, ECG10: -48 DEGREES
CALCULATED T AXIS, ECG11: -14 DEGREES
DIAGNOSIS, 93000: NORMAL
P-R INTERVAL, ECG05: 184 MS
Q-T INTERVAL, ECG07: 474 MS
QRS DURATION, ECG06: 102 MS
QTC CALCULATION (BEZET), ECG08: 457 MS
VENTRICULAR RATE, ECG03: 56 BPM

## 2018-05-24 ENCOUNTER — OFFICE VISIT (OUTPATIENT)
Dept: CARDIOLOGY CLINIC | Age: 63
End: 2018-05-24

## 2018-05-24 VITALS
BODY MASS INDEX: 26.09 KG/M2 | HEART RATE: 50 BPM | HEIGHT: 77 IN | SYSTOLIC BLOOD PRESSURE: 114 MMHG | WEIGHT: 221 LBS | DIASTOLIC BLOOD PRESSURE: 76 MMHG

## 2018-05-24 DIAGNOSIS — Z98.890 STATUS POST CATHETER ABLATION OF ATRIAL FLUTTER: ICD-10-CM

## 2018-05-24 DIAGNOSIS — I48.19 PERSISTENT ATRIAL FIBRILLATION (HCC): ICD-10-CM

## 2018-05-24 DIAGNOSIS — Z98.890 HISTORY OF CARDIOVERSION: ICD-10-CM

## 2018-05-24 DIAGNOSIS — I48.92 PAROXYSMAL ATRIAL FLUTTER (HCC): ICD-10-CM

## 2018-05-24 DIAGNOSIS — Z98.890 S/P ABLATION OF ATRIAL FIBRILLATION: ICD-10-CM

## 2018-05-24 DIAGNOSIS — Z86.79 S/P ABLATION OF ATRIAL FIBRILLATION: ICD-10-CM

## 2018-05-24 DIAGNOSIS — R00.1 SINUS BRADYCARDIA BY ELECTROCARDIOGRAM: Primary | ICD-10-CM

## 2018-05-24 NOTE — PATIENT INSTRUCTIONS
You will be scheduled in 2 months for an echo and holter monitor. You will follow up with Dr Michael Green after the testing.

## 2018-05-24 NOTE — MR AVS SNAPSHOT
[address] [facility] [address] [facility] 
[phone] Patient: Patrice Camilo MRN: [id number] UVX:[id number] Visit Information Date & Time Provider Department Dept. Phone Encounter #  
 5/24/2018  8:00 AM Verenice Gtz MD [facility] [phone] [id number] Follow-up Instructions Return in about 3 months (around 8/24/2018). Upcoming Health Maintenance Date Due DTaP/Tdap/Td series (1 - Tdap) 12/20/1976 FOBT Q 1 YEAR AGE 50-75 12/20/2005 ZOSTER VACCINE AGE 60> 10/20/2015 Influenza Age 5 to Adult 8/1/2018 Allergies as of 5/24/2018  Review Complete On: 5/24/2018 By: Verenice Gtz MD  
  
 Severity Noted Reaction Type Reactions Cephalosporins Medium 10/20/2011   Systemic Other (comments) Intrahepatic cholestasis with cephalosporin, bilirubin valentina as high as 6. NOT AN ALLERGY, but rather an adverse reaction. He could likely tolerate a short course if the risk < benefit. Current Immunizations  Never Reviewed No immunizations on file. Not reviewed this visit You Were Diagnosed With   
  
 Codes Comments Sinus bradycardia by electrocardiogram    -  Primary ICD-10-CM: R00.1 ICD-9-CM: 427.89 Persistent atrial fibrillation (HCC)     ICD-10-CM: I48.1 ICD-9-CM: 427.31 S/P ablation of atrial fibrillation     ICD-10-CM: Z98.890, Z86.79 
ICD-9-CM: V45.89 Status post catheter ablation of atrial flutter     ICD-10-CM: Z98.890 ICD-9-CM: V45.89 History of cardioversion     ICD-10-CM: Z98.890 ICD-9-CM: V15.1 Paroxysmal atrial flutter (HCC)     ICD-10-CM: I48.92 
ICD-9-CM: 427.32 Vitals BP Pulse Height(growth percentile) Weight(growth percentile) BMI Smoking Status 114/76 (BP 1 Location: Right arm, BP Patient Position: Sitting) (!) 50 6' 5\" (1.956 m) 221 lb (100.2 kg) 26.21 kg/m2 Never Smoker Vitals History BMI and BSA Data Body Mass Index Body Surface Area  
 26.21 kg/m 2 2.33 m 2 Preferred Pharmacy Pharmacy Name Phone [address], [address] [place] [address] [phone] Your Updated Medication List  
  
   
This list is accurate as of 5/24/18  8:33 AM.  Always use your most recent med list.  
  
  
  
  
 apixaban 5 mg tablet Commonly known as:  Margarie Tung Take 1 Tab by mouth two (2) times a day. Indications: PREVENT THROMBOEMBOLISM IN CHRONIC ATRIAL FIBRILLATION  
  
 dilTIAZem  mg ER capsule Commonly known as:  CARDIZEM CD Take 1 Cap by mouth daily. flecainide 100 mg tablet Commonly known as:  TAMBOCOR Take 1 Tab by mouth two (2) times a day. multivitamin tablet Commonly known as:  ONE A DAY Take 1 Tab by mouth daily. PROBIOTIC 4X PO Take  by mouth. We Performed the Following AMB POC EKG ROUTINE W/ 12 LEADS, INTER & REP [40473 CPT(R)] Follow-up Instructions Return in about 3 months (around 8/24/2018). To-Do List   
 07/24/2018 ECHO:  2D ECHO COMPLETE ADULT (TTE) W OR WO CONTR   
  
 07/24/2018 ECG:  CARDIAC HOLTER MONITOR, 24 HOURS Patient Instructions You will be scheduled in 2 months for an echo and holter monitor. You will follow up with Dr Zaki Tomas after the testing. Introducing [facility]! [employer] introduces Infor patient portal. Now you can access parts of your medical record, email your doctor's office, and request medication refills online. 1. In your internet browser, go to [email or web address] 2. Click on the First Time User? Click Here link in the Sign In box. You will see the New Member Sign Up page. 3. Enter your Infor Access Code exactly as it appears below. You will not need to use this code after youve completed the sign-up process. If you do not sign up before the expiration date, you must request a new code. · [employer] Access Code: [email or web address] Expires: 5/30/2018  5:23 PM 
 
4. Enter the last four digits of your Social Security Number (xxxx) and Date of Birth (mm/dd/yyyy) as indicated and click Submit. You will be taken to the next sign-up page. 5. Create a [employer] ID. This will be your [employer] login ID and cannot be changed, so think of one that is secure and easy to remember. 6. Create a [employer] password. You can change your password at any time. 7. Enter your Password Reset Question and Answer. This can be used at a later time if you forget your password. 8. Enter your e-mail address. You will receive e-mail notification when new information is available in [address] 9. Click Sign Up. You can now view and download portions of your medical record. 10. Click the Download Summary menu link to download a portable copy of your medical information. If you have questions, please visit the Frequently Asked Questions section of the [employer] website. Remember, [employer] is NOT to be used for urgent needs. For medical emergencies, dial 911. Now available from your iPhone and Android! Please provide this summary of care documentation to your next provider. Your primary care clinician is listed as Gabe Collazo. If you have any questions after today's visit, please call [phone].

## 2018-05-24 NOTE — PROGRESS NOTES
Cardiac Electrophysiology Office Note     Subjective:      Stasia Merlin is a 58 y.o.  male patient whom I had done the cardioversion of atypical atrial flutter with ventricular rate control nonspecific widening of the QRS complex and left axis deviationpost atrial fibrillation ablation. He has been well since he is in NSR  In the past:  I had seen at [facility] and cardioverted his atrial fibrillation in 10/2011. In the past he has sinus bradycardia at rest and with exercise so he does not want to take medications every day   Reported history of stroke related to an air embolism. Was admitted at [facility] for bowel obstruction at that time. Had a central line removed and within 2 minutes CP/SOB sx-found to be an air embolism. Had a 13 day hospital stay with this. Had left sided hemiparesis which resolved within a few days. Denied neuro residual deficits    He only took metoprolol and flecainide prn (pill in the pocket) before ablation    He had the calcium score done ordered by his primary care Dr. Sky Savage and told me that it is 0 score    Echo 2018 with normal LVEF  Mitral valve regurgitation is mild  LA size enlargement is 5 cm, increased with AFIB    Then:  Patient decided to undergo the catheter ablation for atrial fibrillation or flutter. This was done and he was good for about 3 days then he failed irregular heartbeats again when he was laying down. Since then he been in persistent atrial fibrillation. EKG then confirmed that. Other than that he does not have chest pain shortness of breath bleeding in the stool or urine and has no trouble with swallowing.         Past Medical History:   Diagnosis Date    Atrial fibrillation with rapid ventricular response ([address] 10/15/2011    [address] by Dr. Mena Seay 2011    DVT (deep venous thrombosis) ([address]     jessieirbreonna 4/08 around time of bowel surgery;  and again in 2011 after bowel surgery;  coagulopathy workup was OK    Embolism ([address] 4/1/2008    s/p small bowel surgery, at time of catheter removal reduced EF immediately, improved to normal on subsequent echos    PAF (paroxysmal atrial fibrillation) [facility]     present 15 years, had [facility] in 2011 during admission for SBO    Stroke ([facility] 4/5/2008    air embolism after central line removal; s/p small bowel surgery    Ulcerative colitis     s/p total colectomy     Past Surgical History:   Procedure Laterality Date    CARDIOVERSION EXTERNAL  10/17/2011         HX COLECTOMY      total colectomy with IPAA for UC    HX LYSIS OF ADHESIONS      HX OTHER SURGICAL      Echo 10/11 - LVEF 55-60%, mild LAE, mild MR    HX SMALL BOWEL RESECTION      CO CARDIOVERSION ELECTIVE ARRHYTHMIA EXTERNAL  4/27/2018         CO EPHYS EVL TRNSPTL TX ATRIAL FIB ISOLAT PULM VEIN  4/6/2018         CO ICAR CATHETER ABLATION ARRHYTHMIA ADD ON  4/6/2018          Allergies   Allergen Reactions    Cephalosporins Other (comments)     Intrahepatic cholestasis with cephalosporin, bilirubin valentina as high as 6. NOT AN ALLERGY, but rather an adverse reaction. He could likely tolerate a short course if the risk < benefit. Current Outpatient Prescriptions   Medication Sig    flecainide (TAMBOCOR) 100 mg tablet Take 1 Tab by mouth two (2) times a day.  dilTIAZem CD (CARDIZEM CD) 120 mg ER capsule Take 1 Cap by mouth daily.  apixaban (ELIQUIS) 5 mg tablet Take 1 Tab by mouth two (2) times a day. Indications: PREVENT THROMBOEMBOLISM IN CHRONIC ATRIAL FIBRILLATION    multivitamin (ONE A DAY) tablet Take 1 Tab by mouth daily.  B INFANTIS/B ANI/B LUIS FERNANDO/B BIFID (PROBIOTIC 4X PO) Take  by mouth. No current facility-administered medications for this visit.         SOCIAL  FT employed in sales, lots of travelling  , 2 children  Non-smoker  ETOH- social, 2 times a week, beer  Exercises 5 times a week, 35-45 minutes- cardio and light weights/swim/jog  Caffeine- 2 cups of coffee a day  Eats a heart healthy diet, Mediterranean      Review of Systems: Constitutional: Negative for fever, chills, weight loss  HEENT: Negative for nosebleeds, vision changes. Respiratory: Negative for cough, hemoptysis, sputum production, and wheezing. Cardiovascular: Negative for chest pain, orthopnea, claudication, leg swelling, syncope, and PND. Gastrointestinal: Negative for nausea, vomiting, diarrhea, constipation, blood in stool and melena. Genitourinary: Negative for dysuria, and hematuria. Musculoskeletal: Negative for myalgias, arthralgia. Skin: Negative for rash. Heme: Does not bleed or bruise easily. Neurological: Negative for speech change and focal weakness     Objective:     Visit Vitals    /76 (BP 1 Location: Right arm, BP Patient Position: Sitting)    Pulse (!) 50    Ht 6' 5\" (1.956 m)    Wt 221 lb (100.2 kg)    BMI 26.21 kg/m2      Physical Exam:   Constitutional: well-developed and well-nourished. No distress. Head: Normocephalic and atraumatic. Eyes: Pupils are equal, round  Neck: supple. No JVD present. Cardiovascular: regular rhythm and normal heart sounds. Exam reveals no gallop and no friction rub. No murmur heard. Pulmonary/Chest: Effort normal and breath sounds normal. No wheezes. Abdominal: Soft, no tenderness. scar  Musculoskeletal: no edema. Neurological: alert,oriented. Skin: Skin is warm and dry  Psychiatric: normal mood and affect. Behavior is normal. Judgment and thought content normal.       Twelve-lead EKG showed sinus bradycardia LAD  Assessment/Plan:     Encounter Diagnoses   Name Primary?  Sinus bradycardia by electrocardiogram Yes    Persistent atrial fibrillation (HCC)     S/P ablation of atrial fibrillation     Status post catheter ablation of atrial flutter     History of cardioversion     Paroxysmal atrial flutter ([facility]        I recommend him to continue with the Cardizem Eliquis and at the flecainide. In the past when I gave him the pill in the pocket he did not use it.   We need to maintain normal sinus rhythm after the ablation. We discussed about possible ablation in the future if he has recurrence  He is still in blanking period  Recheck echo and 24 hr holter in 2 months before medications are removed  He had LAE and persistent long AFIB AFL before last ablation  The patient agreed to proceed      Thank you for involving me in this patient's care and please call with further concerns or questions. Pablito Ferro M.D.  [facility]  Electrophysiology/Cardiology  [facility] [place]  [address] [place] [address]  [phone]

## 2018-07-12 ENCOUNTER — CLINICAL SUPPORT (OUTPATIENT)
Dept: CARDIOLOGY CLINIC | Age: 63
End: 2018-07-12

## 2018-07-12 DIAGNOSIS — Z98.890 S/P ABLATION OF ATRIAL FIBRILLATION: ICD-10-CM

## 2018-07-12 DIAGNOSIS — Z98.890 STATUS POST CATHETER ABLATION OF ATRIAL FLUTTER: ICD-10-CM

## 2018-07-12 DIAGNOSIS — I48.19 PERSISTENT ATRIAL FIBRILLATION (HCC): ICD-10-CM

## 2018-07-12 DIAGNOSIS — R00.1 SINUS BRADYCARDIA BY ELECTROCARDIOGRAM: ICD-10-CM

## 2018-07-12 DIAGNOSIS — Z98.890 HISTORY OF CARDIOVERSION: ICD-10-CM

## 2018-07-12 DIAGNOSIS — I48.92 PAROXYSMAL ATRIAL FLUTTER (HCC): ICD-10-CM

## 2018-07-12 DIAGNOSIS — Z86.79 S/P ABLATION OF ATRIAL FIBRILLATION: ICD-10-CM

## 2018-07-14 NOTE — PROGRESS NOTES
Echo 7/2018  with LVEF slightly reduced probably from previous AF AFL  LA size has been smaller when he remains in sinus rhythm  Continue with meds and follow up plans    7/19/2018  8:00 AM    MD Bryon Crawford

## 2018-07-14 NOTE — PROGRESS NOTES
Echo with LVEF slightly reduced probably from previous AF AFL  LA size has been smaller when he remains in sinus rhythm  Continue with meds and follow up plans    7/19/2018  8:00 AM    MD Bryon Wheeler

## 2018-07-19 ENCOUNTER — OFFICE VISIT (OUTPATIENT)
Dept: CARDIOLOGY CLINIC | Age: 63
End: 2018-07-19

## 2018-07-19 VITALS
DIASTOLIC BLOOD PRESSURE: 72 MMHG | SYSTOLIC BLOOD PRESSURE: 112 MMHG | WEIGHT: 225 LBS | HEIGHT: 77 IN | BODY MASS INDEX: 26.57 KG/M2 | HEART RATE: 58 BPM

## 2018-07-19 DIAGNOSIS — I51.7 LAE (LEFT ATRIAL ENLARGEMENT): ICD-10-CM

## 2018-07-19 DIAGNOSIS — I48.19 PERSISTENT ATRIAL FIBRILLATION (HCC): Primary | ICD-10-CM

## 2018-07-19 DIAGNOSIS — Z86.79 S/P ABLATION OF ATRIAL FIBRILLATION: ICD-10-CM

## 2018-07-19 DIAGNOSIS — Z79.899 ENCOUNTER FOR MONITORING FLECAINIDE THERAPY: ICD-10-CM

## 2018-07-19 DIAGNOSIS — Z51.81 ENCOUNTER FOR MONITORING FLECAINIDE THERAPY: ICD-10-CM

## 2018-07-19 DIAGNOSIS — Z98.890 HISTORY OF CARDIOVERSION: ICD-10-CM

## 2018-07-19 DIAGNOSIS — Z98.890 S/P ABLATION OF ATRIAL FIBRILLATION: ICD-10-CM

## 2018-07-19 DIAGNOSIS — I48.4 ATYPICAL ATRIAL FLUTTER (HCC): ICD-10-CM

## 2018-07-19 DIAGNOSIS — Z98.890 STATUS POST CATHETER ABLATION OF ATRIAL FLUTTER: ICD-10-CM

## 2018-07-19 NOTE — PROGRESS NOTES
Cardiac Electrophysiology Office Note     Subjective:      Luca Tompkins is a 58 y.o. male patient who presents for follow up on medication and hx of cardioversion, is s/p atrial fibrillation/atrial flutter ablation (04/06/2018) with subsequent synchronized cardioversion (04/27/2018). He has worn post ablation monitor, returns it today. States he has only had a single occurrence of palpitations/irregular heart beat x approx 30 minutes, occurred while wearing monitor. Other than that, states he is doing great. Denies chest pain, shortness of breath, PND, orthopnea, lightheadedness, syncope, or edema. Anticoagulated with Eliquis, denies bleeding issues. Echo (07/12/2018): LVEF 41-75%, grade I diastolic dysfunction. LA mildly dilated (48 mm), RA mildly dilated. Trivial MR, AR, TR, & HI. Previous:  Cardioversion of atypical atrial flutter with ventricular rate control nonspecific widening of the QRS complex and left axis deviation post atrial fibrillation ablation. Initially seen & cardioverted for AF 10/2011. Previous SB at rest, prefers to not have daily meds. Reported history of stroke related to an air embolism. Was admitted at [facility] for bowel obstruction at that time. Had a central line removed and within 2 minutes CP/SOB sx-found to be an air embolism. Had a 13 day hospital stay with this. Had left sided hemiparesis which resolved within a few days. Denied neuro residual deficits. He only took metoprolol and flecainide prn (pill in the pocket) before ablation    He had the calcium score done ordered by his primary care Dr. Jayesh Cueva and told me that it is 0 score    Echo (03/01/2018): normal LVEF. Mitral regurgitation is mild. LA size enlargement is 5 cm, increased with AFIB.       Past Medical History:   Diagnosis Date    Atrial fibrillation with rapid ventricular response ([address] 10/15/2011    [address] by Dr. Monse Augustine 2011    DVT (deep venous thrombosis) ([facility]     jessieirbreonna 4/08 around time of bowel surgery;  and again in 2011 after bowel surgery;  coagulopathy workup was OK    Embolism ([address] 4/1/2008    s/p small bowel surgery, at time of catheter removal reduced EF immediately, improved to normal on subsequent echos    PAF (paroxysmal atrial fibrillation) ([address]     present 15 years, had [facility] in 2011 during admission for SBO    Stroke ([facility] 4/5/2008    air embolism after central line removal; s/p small bowel surgery    Ulcerative colitis     s/p total colectomy     Past Surgical History:   Procedure Laterality Date    CARDIOVERSION EXTERNAL  10/17/2011         HX COLECTOMY      total colectomy with IPAA for UC    HX LYSIS OF ADHESIONS      HX OTHER SURGICAL      Echo 10/11 - LVEF 55-60%, mild LAE, mild MR    HX SMALL BOWEL RESECTION      MD CARDIOVERSION ELECTIVE ARRHYTHMIA EXTERNAL  4/27/2018         MD EPHYS EVL TRNSPTL TX ATRIAL FIB ISOLAT PULM VEIN  4/6/2018         MD ICAR CATHETER ABLATION ARRHYTHMIA ADD ON  4/6/2018          Allergies   Allergen Reactions    Cephalosporins Other (comments)     Intrahepatic cholestasis with cephalosporin, bilirubin valentina as high as 6. NOT AN ALLERGY, but rather an adverse reaction. He could likely tolerate a short course if the risk < benefit. Current Outpatient Prescriptions   Medication Sig    flecainide (TAMBOCOR) 100 mg tablet Take 1 Tab by mouth two (2) times a day.  dilTIAZem CD (CARDIZEM CD) 120 mg ER capsule Take 1 Cap by mouth daily.  apixaban (ELIQUIS) 5 mg tablet Take 1 Tab by mouth two (2) times a day. Indications: PREVENT THROMBOEMBOLISM IN CHRONIC ATRIAL FIBRILLATION    multivitamin (ONE A DAY) tablet Take 1 Tab by mouth daily.  B INFANTIS/B ANI/B LUIS FERNANDO/B BIFID (PROBIOTIC 4X PO) Take  by mouth. No current facility-administered medications for this visit.         SOCIAL  FT employed in sales, lots of travelling  , 2 children  Non-smoker  ETOH- social, 2 times a week, beer  Exercises 5 times a week, 35-45 minutes- cardio and light weights/swim/jog  Caffeine- 2 cups of coffee a day  Eats a heart healthy diet, Mediterranean      Review of Systems:   Constitutional: Negative for fever, chills, weight loss  HEENT: Negative for nosebleeds, vision changes. Respiratory: Negative for cough, hemoptysis, sputum production, and wheezing. Cardiovascular: Negative for chest pain, orthopnea, claudication, leg swelling, syncope, and PND. + single episode palpitations. Gastrointestinal: Negative for nausea, vomiting, diarrhea, constipation, blood in stool and melena. Genitourinary: Negative for dysuria, and hematuria. Musculoskeletal: Negative for myalgias, arthralgia. Skin: Negative for rash. Heme: Does not bleed or bruise easily. Neurological: Negative for speech change and focal weakness     Objective:     Visit Vitals    /72 (BP 1 Location: Left arm, BP Patient Position: Sitting)    Pulse (!) 58    Ht 6' 5\" (1.956 m)    Wt 225 lb (102.1 kg)    BMI 26.68 kg/m2      Physical Exam:   Constitutional: well-developed and well-nourished. No distress. Head: Normocephalic and atraumatic. Eyes: Pupils are equal, round  Neck: supple. No JVD present. Cardiovascular: Slow rate, regular rhythm and normal heart sounds. Exam reveals no gallop and no friction rub. No murmur heard. Pulmonary/Chest: Effort normal and breath sounds normal. No wheezes. Abdominal: Soft, no tenderness. scar. Musculoskeletal: No edema. Neurological: alert,oriented. Skin: Skin is warm and dry  Psychiatric: Normal mood and affect. Behavior is normal. Judgment and thought content normal.         Assessment/Plan:     Encounter Diagnoses   Name Primary?     Persistent atrial fibrillation (HCC) Yes    Atypical atrial flutter (HCC)     S/P ablation of atrial fibrillation     Status post catheter ablation of atrial flutter     LAE (left atrial enlargement)     Encounter for monitoring flecainide therapy     History of cardioversion      Mr. Kendrick Beckford is s/p ablation in April 2018, required synchronized cardioversion later that month. Single reported episode of palpitations since then. Holter monitor returned today, results pending. Continue medications as previously prescribed. With further maintenance of NSR, hopefully LA size will continue to remain decreased compared to previous. (echo 7/2018 LA 48 mm, LA volume 31)    Discussed patient's desire for eventual reduction/elimination of medications, specifically Eliquis. For now, will continue x at least 6 more months, will reassess at that time. Follow up in 6 months, will obtain additional Holter monitoring at that time. Patient will call in the interim for new/worsening signs/symptoms or AF recurrence lasting longer than a few hours. Thank you for involving me in this patient's care and please call with further concerns or questions. Arie Limon M.D.  [facility]  Electrophysiology/Cardiology  [facility] [place]  [address], [address] [place] [address]  [phone]

## 2018-07-19 NOTE — MR AVS SNAPSHOT
[address] [facility] [address] [place] [address] 
[phone] Patient: Darcy Diop MRN: [id number] GMR:30/07/1882 Visit Information Date & Time Provider Department Dept. Phone Encounter #  
 7/19/2018  8:00 AM Isi Jorge MD [facility] Alaina Mckeon [phone] [id number] Follow-up Instructions Return in about 3 months (around 10/19/2018). Your Appointments 1/22/2019  9:00 AM  
PROCEDURE with HOLTER, [facility] ([place] SCHEDULING) Appt Note: 24 hr holter for afib 9:00 6 mo Dr. Adolfo Ochoa 9:40 kmr [address] [place] [address] 63 [address]01 [facility] [address] [address]  
  
    
 1/22/2019  9:40 AM  
ESTABLISHED PATIENT with Isi Jorge MD  
[facility] ([facility]) Appt Note: 24 hr holter for afib 9:00 6 mo Dr. Adolfo Ochoa 9:40 kmr [address] [place] [address] 63 [address] [facility],[address] [id number] Upcoming Health Maintenance Date Due DTaP/Tdap/Td series (1 - Tdap) 12/20/1976 FOBT Q 1 YEAR AGE 50-75 12/20/2005 ZOSTER VACCINE AGE 60> 10/20/2015 Influenza Age 5 to Adult 8/1/2018 Allergies as of 7/19/2018  Review Complete On: 7/19/2018 By: Isi Jorge MD  
  
 Severity Noted Reaction Type Reactions Cephalosporins Medium 10/20/2011   Systemic Other (comments) Intrahepatic cholestasis with cephalosporin, bilirubin valentina as high as 6. NOT AN ALLERGY, but rather an adverse reaction. He could likely tolerate a short course if the risk < benefit. Current Immunizations  Never Reviewed No immunizations on file. Not reviewed this visit You Were Diagnosed With   
  
 Codes Comments Persistent atrial fibrillation (HCC)    -  Primary ICD-10-CM: I48.1 ICD-9-CM: 427.31   
 Atypical atrial flutter (HCC)     ICD-10-CM: I48.4 ICD-9-CM: 427.32 S/P ablation of atrial fibrillation     ICD-10-CM: Z98.890, Z86.79 
ICD-9-CM: V45.89 Status post catheter ablation of atrial flutter     ICD-10-CM: Z98.890 ICD-9-CM: V45.89 LAE (left atrial enlargement)     ICD-10-CM: I51.7 ICD-9-CM: 429.3 Encounter for monitoring flecainide therapy     ICD-10-CM: Z51.81, [id number] ICD-9-CM: V58.83, V58.69 Vitals BP Pulse Height(growth percentile) Weight(growth percentile) BMI Smoking Status 112/72 (BP 1 Location: Left arm, BP Patient Position: Sitting) (!) 58 6' 5\" (1.956 m) 225 lb (102.1 kg) 26.68 kg/m2 Never Smoker Vitals History BMI and BSA Data Body Mass Index Body Surface Area  
 26.68 kg/m 2 2.36 m 2 Preferred Pharmacy Pharmacy Name Phone [facility] [address]  [facility] [phone] Your Updated Medication List  
  
   
This list is accurate as of 7/19/18  8:28 AM.  Always use your most recent med list.  
  
  
  
  
 apixaban 5 mg tablet Commonly known as:  Timmothy [place] Take 1 Tab by mouth two (2) times a day. Indications: PREVENT THROMBOEMBOLISM IN CHRONIC ATRIAL FIBRILLATION  
  
 dilTIAZem  mg ER capsule Commonly known as:  CARDIZEM CD Take 1 Cap by mouth daily. flecainide 100 mg tablet Commonly known as:  TAMBOCOR Take 1 Tab by mouth two (2) times a day. multivitamin tablet Commonly known as:  ONE A DAY Take 1 Tab by mouth daily. PROBIOTIC 4X PO Take  by mouth. Follow-up Instructions Return in about 3 months (around 10/19/2018). Introducing [address]! [facility] [employer] introduces [employer] patient portal. Now you can access parts of your medical record, email your doctor's office, and request medication refills online. 1. In your internet browser, go to [email or web address] 2. Click on the First Time User? Click Here link in the Sign In box. You will see the New Member Sign Up page. 3. Enter your [facility] Access Code exactly as it appears below. You will not need to use this code after youve completed the sign-up process. If you do not sign up before the expiration date, you must request a new code. · [facility] Access Code: [email or web address] Expires: 10/11/2018  8:09 AM 
 
4. Enter the last four digits of your Social Security Number (xxxx) and Date of Birth (mm/dd/yyyy) as indicated and click Submit. You will be taken to the next sign-up page. 5. Create a [facility] ID. This will be your [facility] login ID and cannot be changed, so think of one that is secure and easy to remember. 6. Create a [facility] password. You can change your password at any time. 7. Enter your Password Reset Question and Answer. This can be used at a later time if you forget your password. 8. Enter your e-mail address. You will receive e-mail notification when new information is available in [address] 9. Click Sign Up. You can now view and download portions of your medical record. 10. Click the Download Summary menu link to download a portable copy of your medical information. If you have questions, please visit the Frequently Asked Questions section of the [facility] website. Remember, [facility] is NOT to be used for urgent needs. For medical emergencies, dial 911. Now available from your iPhone and Android! Please provide this summary of care documentation to your next provider. Your primary care clinician is listed as Eliane Ness. If you have any questions after today's visit, please call [phone].

## 2018-07-19 NOTE — PROGRESS NOTES
Verified pharmacy with patient. Verified medications with the patient. Verified patient with two types of identifiers.

## 2018-07-29 NOTE — PROGRESS NOTES
holter with sinus rate 43-86 bpm  57 PAC  4 beat atrial tach  No AFIB or flutter  11 PVC    Continue to follow up    Future Appointments  Date Time Provider Maryam Hines   1/22/2019 9:00 AM HOLTER, [address] [facility]   1/22/2019 9:40 AM Alecia Deng  [address]

## 2018-07-30 ENCOUNTER — TELEPHONE (OUTPATIENT)
Dept: CARDIOLOGY CLINIC | Age: 63
End: 2018-07-30

## 2018-07-30 NOTE — TELEPHONE ENCOUNTER
----- Message from Whitney Leyva MD sent at 7/29/2018  2:23 PM EDT -----  holter with sinus rate 43-86 bpm  57 PAC  4 beat atrial tach  No AFIB or flutter  11 PVC    Continue to follow up

## 2018-08-16 NOTE — TELEPHONE ENCOUNTER
Requested Prescriptions     Signed Prescriptions Disp Refills    apixaban (ELIQUIS) 5 mg tablet 60 Tab 5     Sig: Take 1 Tab by mouth two (2) times a day. Indications: PREVENT THROMBOEMBOLISM IN CHRONIC ATRIAL FIBRILLATION     Authorizing Provider: SENDY ROONEY     Ordering User: Edil Press     Per verbal order Dr. Samuel Reynoso.

## 2018-10-24 ENCOUNTER — TELEPHONE (OUTPATIENT)
Dept: CARDIOLOGY CLINIC | Age: 63
End: 2018-10-24

## 2018-10-24 NOTE — TELEPHONE ENCOUNTER
Faxed instructions to hold Eliquis to [address] [place] [address] at fax number [phone]. Fax confirmation received.

## 2018-10-29 RX ORDER — FLECAINIDE ACETATE 100 MG/1
TABLET ORAL
Qty: 180 TAB | Refills: 1 | Status: SHIPPED | OUTPATIENT
Start: 2018-10-29 | End: 2019-02-03 | Stop reason: SDUPTHER

## 2018-10-29 NOTE — TELEPHONE ENCOUNTER
Request for Flecainide 100 mg BID. Last office visit 7/19/18, next office visit 1/22/19. Refills per verbal order from Dr. Jacklyn Vidales.

## 2019-01-22 ENCOUNTER — CLINICAL SUPPORT (OUTPATIENT)
Dept: CARDIOLOGY CLINIC | Age: 64
End: 2019-01-22

## 2019-01-22 ENCOUNTER — OFFICE VISIT (OUTPATIENT)
Dept: CARDIOLOGY CLINIC | Age: 64
End: 2019-01-22

## 2019-01-22 VITALS
WEIGHT: 221 LBS | HEIGHT: 77 IN | BODY MASS INDEX: 26.09 KG/M2 | DIASTOLIC BLOOD PRESSURE: 82 MMHG | HEART RATE: 66 BPM | SYSTOLIC BLOOD PRESSURE: 120 MMHG

## 2019-01-22 DIAGNOSIS — I48.91 ATRIAL FIBRILLATION, UNSPECIFIED TYPE (HCC): Primary | ICD-10-CM

## 2019-01-22 DIAGNOSIS — Z98.890 STATUS POST CATHETER ABLATION OF ATRIAL FLUTTER: ICD-10-CM

## 2019-01-22 DIAGNOSIS — I48.91 ATRIAL FIBRILLATION WITH RAPID VENTRICULAR RESPONSE (HCC): Primary | ICD-10-CM

## 2019-01-22 DIAGNOSIS — Z86.79 S/P ABLATION OF ATRIAL FIBRILLATION: ICD-10-CM

## 2019-01-22 DIAGNOSIS — I07.1 TRICUSPID VALVE INSUFFICIENCY, UNSPECIFIED ETIOLOGY: ICD-10-CM

## 2019-01-22 DIAGNOSIS — Z98.890 S/P ABLATION OF ATRIAL FIBRILLATION: ICD-10-CM

## 2019-01-22 DIAGNOSIS — I34.0 MITRAL VALVE INSUFFICIENCY, UNSPECIFIED ETIOLOGY: ICD-10-CM

## 2019-01-22 DIAGNOSIS — I51.7 LAE (LEFT ATRIAL ENLARGEMENT): ICD-10-CM

## 2019-01-22 DIAGNOSIS — I48.4 ATYPICAL ATRIAL FLUTTER (HCC): ICD-10-CM

## 2019-01-22 NOTE — PROGRESS NOTES
Cardiac Electrophysiology Office Note     Subjective:      Eda France is a 61 y.o. male patient who presents for follow up on medication and hx of cardioversion, is s/p atrial fibrillation/atrial flutter ablation (04/06/2018) with subsequent synchronized cardioversion (04/27/2018). He has worn post ablation monitor, showed sinus rhythm 43-86 bpm, no AF or AFL. 4 beat run of AT, 57 PACs, 11 PVCs. He just had another Holter monitor applied in clinic today. He denies any palpitations or fatigue. Denies chest pain, shortness of breath, PND, orthopnea, lightheadedness, syncope, or edema. Anticoagulated with Eliquis, denies bleeding issues. He is interested in potentially switching back to ASA 81 mg po daily. Previous:  Echo (07/12/2018): LVEF 69-23%, grade I diastolic dysfunction. LA mildly dilated (48 mm), RA mildly dilated. Trivial MR, AR, TR, & NC. Cardioversion of atypical atrial flutter with ventricular rate control nonspecific widening of the QRS complex and left axis deviation post atrial fibrillation ablation. Initially seen & cardioverted for AF 10/2011. Previous SB at rest, prefers to not have daily meds. Reported history of stroke related to an air embolism. Was admitted at [facility] for bowel obstruction at that time. Had a central line removed and within 2 minutes CP/SOB sx-found to be an air embolism. Had a 13 day hospital stay with this. Had left sided hemiparesis which resolved within a few days. Denied neuro residual deficits. He only took metoprolol and flecainide prn (pill in the pocket) before ablation    He had the calcium score done ordered by his primary care Dr. Mayte Fleming and told me that it is 0 score    Echo (03/01/2018): normal LVEF. Mitral regurgitation is mild. LA size enlargement is 5 cm, increased with AFIB.       Past Medical History:   Diagnosis Date    Atrial fibrillation with rapid ventricular response ([address] 10/15/2011    [facility] by Dr. Emeli Lee 2011    DVT (deep venous thrombosis) ([address]     duirng 4/08 around time of bowel surgery;  and again in 2011 after bowel surgery;  coagulopathy workup was OK    Embolism ([address] 4/1/2008    s/p small bowel surgery, at time of catheter removal reduced EF immediately, improved to normal on subsequent echos    PAF (paroxysmal atrial fibrillation) ([address]     present 15 years, had [facility] in 2011 during admission for SBO    Stroke ([address] 4/5/2008    air embolism after central line removal; s/p small bowel surgery    Ulcerative colitis     s/p total colectomy     Past Surgical History:   Procedure Laterality Date    CARDIOVERSION EXTERNAL  10/17/2011         HX COLECTOMY      total colectomy with IPAA for UC    HX LYSIS OF ADHESIONS      HX OTHER SURGICAL      Echo 10/11 - LVEF 55-60%, mild LAE, mild MR    HX SMALL BOWEL RESECTION      [place] CARDIOVERSION ELECTIVE ARRHYTHMIA EXTERNAL  4/27/2018         [place] EPHYS EVL TRNSPTL TX ATRIAL FIB ISOLAT PULM VEIN  4/6/2018         [place] ICAR CATHETER ABLATION ARRHYTHMIA ADD ON  4/6/2018          Allergies   Allergen Reactions    Cephalosporins Other (comments)     Intrahepatic cholestasis with cephalosporin, bilirubin valentina as high as 6. NOT AN ALLERGY, but rather an adverse reaction. He could likely tolerate a short course if the risk < benefit. Current Outpatient Medications   Medication Sig    flecainide (TAMBOCOR) 100 mg tablet TAKE 1 TABLET BY MOUTH TWICE A DAY    multivitamin (ONE A DAY) tablet Take 1 Tab by mouth daily.  B INFANTIS/B ANI/B LUIS FERNANDO/B BIFID (PROBIOTIC 4X PO) Take  by mouth. No current facility-administered medications for this visit.         SOCIAL  FT employed in sales, lots of travelling  , 2 children  Non-smoker  ETOH- social, 2 times a week, beer  Exercises 5 times a week, 35-45 minutes- cardio and light weights/swim/jog  Caffeine- 2 cups of coffee a day  Eats a heart healthy diet, Mediterranean      Review of Systems:   Constitutional: Negative for fever, chills, weight loss  HEENT: Negative for nosebleeds, vision changes. Respiratory: Negative for cough, hemoptysis, sputum production, and wheezing. Cardiovascular: Negative for chest pain, palpitations, orthopnea, claudication, leg swelling, syncope, and PND. Gastrointestinal: Negative for nausea, vomiting, diarrhea, constipation, blood in stool and melena. Genitourinary: Negative for dysuria, and hematuria. Musculoskeletal: Negative for myalgias, arthralgia. Skin: Negative for rash. Heme: Does not bleed or bruise easily. Neurological: Negative for speech change and focal weakness     Objective:     Visit Vitals  /82 (BP 1 Location: Left arm, BP Patient Position: Sitting)   Pulse 66   Ht 6' 5\" (1.956 m)   Wt 221 lb (100.2 kg)   BMI 26.21 kg/m²      Physical Exam:   Constitutional: well-developed and well-nourished. No distress. Head: Normocephalic and atraumatic. Eyes: Pupils are equal, round  Neck: supple. No JVD present. Cardiovascular: Regular rate, regular rhythm and normal heart sounds. Exam reveals no gallop and no friction rub. No murmur heard. Pulmonary/Chest: Effort normal and breath sounds normal. No wheezes. Abdominal: Soft, no tenderness. Musculoskeletal: No edema. Neurological: alert,oriented. Skin: Skin is warm and dry  Psychiatric: Normal mood and affect. Behavior is normal. Judgment and thought content normal.       ECG: NSR with QRSd 116 ms. artifacts    Assessment/Plan:     Encounter Diagnoses   Name Primary?  Atrial fibrillation with rapid ventricular response (HCC) Yes    Atypical atrial flutter (HCC)     S/P ablation of atrial fibrillation     Status post catheter ablation of atrial flutter     LAE (left atrial enlargement)     Mitral valve insufficiency, unspecified etiology     Tricuspid valve insufficiency, unspecified etiology      Mr. Maile Lucio is s/p ablation in April 2018, required synchronized cardioversion later that month. Subsequent holter showed no AF or AFL. Single reported episode of palpitations since then, none recently. Holter monitor placed today, results pending. [id number] 0. Given duration since ablation, he may stop Eliquis, will start ASA 81 mg po daily for embolic CVA prophylaxis. As he had ablation of CTI dependent atrial flutter as well, he may stop diltiazem. He will continue with flecainide as previously prescribed. QRSd without significant prolongation. Should he develop recurrence of AF, would then restart diltiazem and call me. Follow up in 6 months, will obtain ECG at that time to monitor QRSd. He will call sooner for new/worsening symptoms. We will contact him with holter monitor results in the interim. Thank you for involving me in this patient's care and please call with further concerns or questions. Anais Zhang M.D.  [facility] [place]  Electrophysiology/Cardiology  [facility] [place]  [address], [address] [place] [address]  [phone]

## 2019-01-22 NOTE — PROGRESS NOTES
Cardiac Electrophysiology Office Note Subjective:  
  
Krystal Patrick is a 61 y.o. male patient who presents for follow up on medication and hx of cardioversion, is s/p atrial fibrillation/atrial flutter ablation (04/06/2018) with subsequent synchronized cardioversion (04/27/2018). He has worn post ablation monitor, showed sinus rhythm 43-86 bpm, no AF or AFL. 4 beat run of AT, 57 PACs, 11 PVCs. He just had another Holter monitor applied in clinic today. He denies any palpitations or fatigue. Denies chest pain, shortness of breath, PND, orthopnea, lightheadedness, syncope, or edema. Anticoagulated with Eliquis, denies bleeding issues. He is interested in potentially switching back to ASA 81 mg po daily. Previous: 
Echo (07/12/2018): LVEF 91-98%, grade I diastolic dysfunction. LA mildly dilated (48 mm), RA mildly dilated. Trivial MR, AR, TR, & OK. Cardioversion of atypical atrial flutter with ventricular rate control nonspecific widening of the QRS complex and left axis deviation post atrial fibrillation ablation. Initially seen & cardioverted for AF 10/2011. Previous SB at rest, prefers to not have daily meds. Reported history of stroke related to an air embolism. Was admitted at [address] [place] [address] for bowel obstruction at that time. Had a central line removed and within 2 minutes CP/SOB sx-found to be an air embolism. Had a 13 day hospital stay with this. Had left sided hemiparesis which resolved within a few days. Denied neuro residual deficits. He only took metoprolol and flecainide prn (pill in the pocket) before ablation He had the calcium score done ordered by his primary care Dr. Alcides Saunders and told me that it is 0 score Echo (03/01/2018): normal LVEF. Mitral regurgitation is mild. LA size enlargement is 5 cm, increased with AFIB. Past Medical History:  
Diagnosis Date  Atrial fibrillation with rapid ventricular response ([address] 10/15/2011 [address] by Dr. Ever Quintana 2011  DVT (deep venous thrombosis) ([facility] duirng 4/08 around time of bowel surgery;  and again in 2011 after bowel surgery;  coagulopathy workup was OK  Embolism ([facility] 4/1/2008  
 s/p small bowel surgery, at time of catheter removal reduced EF immediately, improved to normal on subsequent echos  PAF (paroxysmal atrial fibrillation) ([facility] present 15 years, had [address] in 2011 during admission for SBO  Stroke ([facility] 4/5/2008  
 air embolism after central line removal; s/p small bowel surgery  Ulcerative colitis s/p total colectomy Past Surgical History:  
Procedure Laterality Date  CARDIOVERSION EXTERNAL  10/17/2011  HX COLECTOMY    
 total colectomy with IPAA for UC  
 HX LYSIS OF ADHESIONS    
 HX OTHER SURGICAL Echo 10/11 - LVEF 55-60%, mild LAE, mild MR  
 HX SMALL BOWEL RESECTION    
 [place] CARDIOVERSION ELECTIVE ARRHYTHMIA EXTERNAL  4/27/2018  [place] EPHYS EVL TRNSPTL TX ATRIAL FIB ISOLAT PULM VEIN  4/6/2018  [place] ICAR CATHETER ABLATION ARRHYTHMIA ADD ON  4/6/2018 Allergies Allergen Reactions  Cephalosporins Other (comments) Intrahepatic cholestasis with cephalosporin, bilirubin valentina as high as 6. NOT AN ALLERGY, but rather an adverse reaction. He could likely tolerate a short course if the risk < benefit. Current Outpatient Medications Medication Sig  
 flecainide (TAMBOCOR) 100 mg tablet TAKE 1 TABLET BY MOUTH TWICE A DAY  apixaban (ELIQUIS) 5 mg tablet Take 1 Tab by mouth two (2) times a day. Indications: PREVENT THROMBOEMBOLISM IN CHRONIC ATRIAL FIBRILLATION  
 dilTIAZem CD (CARDIZEM CD) 120 mg ER capsule Take 1 Cap by mouth daily.  multivitamin (ONE A DAY) tablet Take 1 Tab by mouth daily.  B INFANTIS/B ANI/B LUIS FERNANDO/B BIFID (PROBIOTIC 4X PO) Take  by mouth. No current facility-administered medications for this visit. SOCIAL 
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  
Constitutional: Negative for fever, chills, weight loss HEENT: Negative for nosebleeds, vision changes. Respiratory: Negative for cough, hemoptysis, sputum production, and wheezing. Cardiovascular: Negative for chest pain, palpitations, orthopnea, claudication, leg swelling, syncope, and PND. Gastrointestinal: Negative for nausea, vomiting, diarrhea, constipation, blood in stool and melena. Genitourinary: Negative for dysuria, and hematuria. Musculoskeletal: Negative for myalgias, arthralgia. Skin: Negative for rash. Heme: Does not bleed or bruise easily. Neurological: Negative for speech change and focal weakness Objective:  
 
Visit Vitals /82 (BP 1 Location: Left arm, BP Patient Position: Sitting) Pulse 66 Ht 6' 5\" (1.956 m) Wt 221 lb (100.2 kg) BMI 26.21 kg/m² Physical Exam:  
Constitutional: well-developed and well-nourished. No distress. Head: Normocephalic and atraumatic. Eyes: Pupils are equal, round Neck: supple. No JVD present. Cardiovascular: Regular rate, regular rhythm and normal heart sounds. Exam reveals no gallop and no friction rub. No murmur heard. Pulmonary/Chest: Effort normal and breath sounds normal. No wheezes. Abdominal: Soft, no tenderness. Musculoskeletal: No edema. Neurological: alert,oriented. Skin: Skin is warm and dry Psychiatric: Normal mood and affect. Behavior is normal. Judgment and thought content normal.   
  
ECG: NSR with QRSd 116 ms. Assessment/Plan:  
 
Encounter Diagnoses Name Primary?  Atrial fibrillation with rapid ventricular response (HCC) Yes  Atypical atrial flutter ([address]  S/P ablation of atrial fibrillation  Status post catheter ablation of atrial flutter  LAE (left atrial enlargement)  Mitral valve insufficiency, unspecified etiology  Tricuspid valve insufficiency, unspecified etiology Mr. Samson White is s/p ablation in April 2018, required synchronized cardioversion later that month. Subsequent holter showed no AF or AFL. Single reported episode of palpitations since then, none recently. Holter monitor placed today, results pending. [id number] 0. Given duration since ablation, he may stop Eliquis, will start ASA 81 mg po daily for embolic CVA prophylaxis. As he had ablation of CTI dependent atrial flutter as well, he may stop diltiazem. He will continue with flecainide as previously prescribed. QRSd without significant prolongation. Should he develop recurrence of AF, would then restart diltiazem. Follow up in 6 months, will obtain ECG at that time to monitor QRSd. He will call sooner for new/worsening symptoms. We will contact him with holter monitor results in the interim. Thank you for involving me in this patient's care and please call with further concerns or questions. Jaclyn Carvajal M.D. [facility] [place] Electrophysiology/Cardiology [facility] [place] [address], [address] [place] [address] 
[phone]

## 2019-01-30 ENCOUNTER — TELEPHONE (OUTPATIENT)
Dept: CARDIOLOGY CLINIC | Age: 64
End: 2019-01-30

## 2019-01-30 NOTE — TELEPHONE ENCOUNTER
Holter reviewd by Dr Janki Knight shows NSR with isolated SVT (5beat run). Attempted to reach patient by telephone. A message was left for return call.

## 2019-01-30 NOTE — PROGRESS NOTES
Holter showed PAC and PVC and 5 beat atrial tach  No AFIB or Atrial flutter    Continue with current plan

## 2019-02-04 RX ORDER — FLECAINIDE ACETATE 100 MG/1
TABLET ORAL
Qty: 180 TAB | Refills: 0 | Status: SHIPPED | OUTPATIENT
Start: 2019-02-04 | End: 2019-08-09 | Stop reason: SDUPTHER

## 2019-08-09 RX ORDER — FLECAINIDE ACETATE 100 MG/1
TABLET ORAL
Qty: 180 TAB | Refills: 1 | Status: SHIPPED | OUTPATIENT
Start: 2019-08-09 | End: 2020-02-18

## 2019-08-09 NOTE — TELEPHONE ENCOUNTER
Request for Flecainide 100 mg BID. Last office visit 1/22/19, next office visit 10/15/19. Refills per verbal order from Dr. Jacob Fernandez.

## 2019-10-15 ENCOUNTER — OFFICE VISIT (OUTPATIENT)
Dept: CARDIOLOGY CLINIC | Age: 64
End: 2019-10-15

## 2019-10-15 VITALS
DIASTOLIC BLOOD PRESSURE: 80 MMHG | HEART RATE: 59 BPM | OXYGEN SATURATION: 97 % | BODY MASS INDEX: 26.45 KG/M2 | RESPIRATION RATE: 18 BRPM | SYSTOLIC BLOOD PRESSURE: 118 MMHG | HEIGHT: 77 IN | WEIGHT: 224 LBS

## 2019-10-15 DIAGNOSIS — I49.1 PAC (PREMATURE ATRIAL CONTRACTION): ICD-10-CM

## 2019-10-15 DIAGNOSIS — I34.0 MITRAL VALVE INSUFFICIENCY, UNSPECIFIED ETIOLOGY: ICD-10-CM

## 2019-10-15 DIAGNOSIS — Z98.890 S/P ABLATION OF ATRIAL FIBRILLATION: ICD-10-CM

## 2019-10-15 DIAGNOSIS — I47.1 PAT (PAROXYSMAL ATRIAL TACHYCARDIA) (HCC): ICD-10-CM

## 2019-10-15 DIAGNOSIS — Z86.79 S/P ABLATION OF ATRIAL FIBRILLATION: ICD-10-CM

## 2019-10-15 DIAGNOSIS — I51.7 LAE (LEFT ATRIAL ENLARGEMENT): ICD-10-CM

## 2019-10-15 DIAGNOSIS — Z98.890 STATUS POST CATHETER ABLATION OF ATRIAL FLUTTER: ICD-10-CM

## 2019-10-15 DIAGNOSIS — I48.19 PERSISTENT ATRIAL FIBRILLATION (HCC): Primary | ICD-10-CM

## 2019-10-15 DIAGNOSIS — I48.92 PAROXYSMAL ATRIAL FLUTTER (HCC): ICD-10-CM

## 2019-10-15 DIAGNOSIS — I49.3 PVC (PREMATURE VENTRICULAR CONTRACTION): ICD-10-CM

## 2019-10-15 RX ORDER — ASPIRIN 81 MG/1
TABLET ORAL DAILY
COMMUNITY

## 2019-10-15 NOTE — PROGRESS NOTES
Cardiac Electrophysiology Office Note     Subjective:      Maylin Lovell is a 61 y.o. male patient who presents for follow up on medication and hx of cardioversion, is s/p atrial fibrillation/atrial flutter ablation (04/06/2018) with subsequent synchronized cardioversion (04/27/2018). Holter showed PAC and PVC and 5 beat atrial tach  No AFIB or Atrial flutter     He denies any palpitations or fatigue. Denies chest pain, shortness of breath, PND, orthopnea, lightheadedness, syncope, or edema. ASA 81 mg po daily. flecainide 100 mg bid and has no palpitation       Previous:  Echo (07/12/2018): LVEF 82-02%, grade I diastolic dysfunction. LA mildly dilated (48 mm), RA mildly dilated. Trivial MR, AR, TR, & RI. Cardioversion of atypical atrial flutter with ventricular rate control nonspecific widening of the QRS complex and left axis deviation post atrial fibrillation ablation. Initially seen & cardioverted for AF 10/2011. Previous SB at rest, prefers to not have daily meds. Reported history of stroke related to an air embolism. Was admitted at [facility] for bowel obstruction at that time. Had a central line removed and within 2 minutes CP/SOB sx-found to be an air embolism. Had a 13 day hospital stay with this. Had left sided hemiparesis which resolved within a few days. Denied neuro residual deficits. He only took metoprolol and flecainide prn (pill in the pocket) before ablation    He had the calcium score done ordered by his primary care Dr. Cholo Mckeon and told me that it is 0 score    Echo (03/01/2018): normal LVEF. Mitral regurgitation is mild. LA size enlargement is 5 cm, increased with AFIB.       Past Medical History:   Diagnosis Date    Atrial fibrillation with rapid ventricular response ([address] 10/15/2011    [address] by Dr. Zac Waldron 2011    DVT (deep venous thrombosis) ([address]     jessieirbreonna 4/08 around time of bowel surgery;  and again in 2011 after bowel surgery;  coagulopathy workup was OK    Embolism ([facility] 4/1/2008    s/p small bowel surgery, at time of catheter removal reduced EF immediately, improved to normal on subsequent echos    PAF (paroxysmal atrial fibrillation) ([facility]     present 15 years, had [facility] in 2011 during admission for SBO    Stroke ([facility] 4/5/2008    air embolism after central line removal; s/p small bowel surgery    Ulcerative colitis     s/p total colectomy     Past Surgical History:   Procedure Laterality Date    CARDIOVERSION EXTERNAL  10/17/2011         HX COLECTOMY      total colectomy with IPAA for UC    HX LYSIS OF ADHESIONS      HX OTHER SURGICAL      Echo 10/11 - LVEF 55-60%, mild LAE, mild MR    HX SMALL BOWEL RESECTION      [place] CARDIOVERSION ELECTIVE ARRHYTHMIA EXTERNAL  4/27/2018         [place] EPHYS EVL TRNSPTL TX ATRIAL FIB ISOLAT PULM VEIN  4/6/2018         [place] ICAR CATHETER ABLATION ARRHYTHMIA ADD ON  4/6/2018          Allergies   Allergen Reactions    Cephalosporins Other (comments)     Intrahepatic cholestasis with cephalosporin, bilirubin valentina as high as 6. NOT AN ALLERGY, but rather an adverse reaction. He could likely tolerate a short course if the risk < benefit. Current Outpatient Medications   Medication Sig    aspirin delayed-release 81 mg tablet Take  by mouth daily.  flecainide (TAMBOCOR) 100 mg tablet TAKE 1 TABLET BY MOUTH TWICE DAILY    multivitamin (ONE A DAY) tablet Take 1 Tab by mouth daily.  B INFANTIS/B ANI/B LUIS FERNANDO/B BIFID (PROBIOTIC 4X PO) Take  by mouth. No current facility-administered medications for this visit. SOCIAL  FT employed in sales, lots of travelling  , 2 children  Non-smoker  ETOH- social, 2 times a week, beer  Exercises 5 times a week, 35-45 minutes- cardio and light weights/swim/jog  Caffeine- 2 cups of coffee a day  Eats a heart healthy diet, Mediterranean      Review of Systems:   Constitutional: Negative for fever, chills, weight loss  HEENT: Negative for nosebleeds, vision changes.    Respiratory: Negative for cough, hemoptysis, sputum production, and wheezing. Cardiovascular: Negative for chest pain, palpitations, orthopnea, claudication, leg swelling, syncope, and PND. Gastrointestinal: Negative for nausea, vomiting, diarrhea, constipation, blood in stool and melena. Genitourinary: Negative for dysuria, and hematuria. Musculoskeletal: Negative for myalgias, arthralgia. Skin: Negative for rash. Heme: Does not bleed or bruise easily. Neurological: Negative for speech change and focal weakness     Objective:     Visit Vitals  /80 (BP 1 Location: Left arm, BP Patient Position: Sitting)   Pulse (!) 59   Resp 18   Ht 6' 5\" (1.956 m)   Wt 224 lb (101.6 kg)   SpO2 97%   BMI 26.56 kg/m²      Physical Exam:   Constitutional: well-developed and well-nourished. No distress. Head: Normocephalic and atraumatic. Eyes: Pupils are equal, round  Neck: supple. No JVD present. Cardiovascular: slow rate, regular rhythm and normal heart sounds. Exam reveals no gallop and no friction rub. No murmur heard. Pulmonary/Chest: Effort normal and breath sounds normal. No wheezes. Abdominal: Soft, no tenderness. Musculoskeletal: No edema. Neurological: alert,oriented. Skin: Skin is warm and dry  Psychiatric: Normal mood and affect. Behavior is normal. Judgment and thought content normal.       ECG: sinus rhythm LAFB normal QRS duration    Assessment/Plan:     Encounter Diagnoses   Name Primary?  Persistent atrial fibrillation Yes    S/P ablation of atrial fibrillation     Status post catheter ablation of atrial flutter     LAE (left atrial enlargement)     Mitral valve insufficiency, unspecified etiology     Paroxysmal atrial flutter (HCC)     PAC (premature atrial contraction)     PVC (premature ventricular contraction)     PAT (paroxysmal atrial tachycardia) [facility])      Mr. Dariel Fox is s/p ablation in April 2018, required synchronized cardioversion later that month.   Subsequent holter showed no AF or AFL. [id number] 0. ASA 81 mg po daily      As he had ablation of CTI dependent atrial flutter as well, he does not take diltiazem. He has taken flecainide 100 mg bid and feels fine    He will reduce to 50 mg bid flecainide    Follow up in 6 months, will obtain ECG at that time to monitor QRSd. He will call sooner for new/worsening symptoms. Thank you for involving me in this patient's care and please call with further concerns or questions. Sheldon Johnson M.D.  [facility] [place]  Electrophysiology/Cardiology  [facility] [place]  [address] [facility] [address]  [facility], [facility] [address]                             [address] [place] [address]  [phone]

## 2019-10-15 NOTE — PROGRESS NOTES
Gemini Rivera is a 61 y.o. male    Chief Complaint   Patient presents with    Follow-up     6 mo     Irregular Heart Beat     Afib       Visit Vitals  /80 (BP 1 Location: Left arm, BP Patient Position: Sitting)   Pulse (!) 59   Resp 18   Ht 6' 5\" (1.956 m)   Wt 224 lb (101.6 kg)   SpO2 97%   BMI 26.56 kg/m²       1. Have you been to the ER, urgent care clinic since your last visit? Hospitalized since your last visit? No    2. Have you seen or consulted any other health care providers outside of the [address] [place] [address] since your last visit? Include any pap smears or colon screening.  No

## 2020-02-18 RX ORDER — FLECAINIDE ACETATE 100 MG/1
TABLET ORAL
Qty: 180 TAB | Refills: 1 | Status: SHIPPED | OUTPATIENT
Start: 2020-02-18 | End: 2020-08-25 | Stop reason: SDUPTHER

## 2020-02-18 NOTE — TELEPHONE ENCOUNTER
Request for Flecainide 100 mg BID. Last office visit 10/15/19, next office visit 4/21/20. Refills per verbal order from Dr. Adenike Ashley.

## 2020-04-07 ENCOUNTER — TELEPHONE (OUTPATIENT)
Dept: CARDIOLOGY CLINIC | Age: 65
End: 2020-04-07

## 2020-04-09 NOTE — TELEPHONE ENCOUNTER
Left a voice message on 4/9/2020 requesting a return call to discuss converting 4/21/2020 appt to VV or Phone Call.

## 2020-04-10 NOTE — TELEPHONE ENCOUNTER
Left a voice message on 4/10/2020 requesting a return call to discuss converting 4/21/2020 appt to VV or Phone Call.

## 2020-04-24 NOTE — PROGRESS NOTES
Cardiac Electrophysiology VIRTUAL VISIT Note     Pursuant to the emergency declaration under the [facility], [facility] waiver authority and the [employer] Act, this Virtual  Visit was conducted, with patient's consent, to reduce the patient's risk of exposure to COVID-19 and provide continuity of care for an established patient. Services were provided through a video synchronous discussion virtually to substitute for in-person clinic visit. Subjective:      Ag Whitney is a 59 y.o. male patient who is seen today via synchronous video for follow up of PAF & AFL, is s/p ablation on 04/06/2018. He had recurrent AF/AFL shortly after ablation, required DCCV. Post ablation holter later showed PACs, PVCs, & PAT x 5 beats. No AF/AFL noted. Currently on flecainide, no AV clare blocker due to tendency for resting bradycardia. Last ECG on 10/15/2019 showed sinus bradycardia (59 bpm) with LAFB, QRSd 116 bpm.    Denies any palpitations or fatigue. Denies chest pain, shortness of breath, PND, orthopnea, lightheadedness, syncope, or edema. No OAC, on ASA 81 mg po daily. He feels that flecainide is helping if he takes BID he has few irregular beats and wants to stay on it      Previous:  Echo (07/12/2018): LVEF 94-99%, grade I diastolic dysfunction. LA mildly dilated (48 mm), RA mildly dilated. Trivial MR, AR, TR, & RI.    DCCV 04/27/2020. S/p AF/AFL ablation 04/06/2018. History of CVA related to air embolism. Admitted at [facility] for SBO at that time. Central line removed and within 2 minutes CP/SOB sx-found to be an air embolism. Left sided hemiparesis which resolved within a few days. Denied neuro residual deficits. Only took metoprolol and flecainide prn (pill in the pocket) before ablation. Calcium score reportedly 0. DVT post bowel surgery in 2008 & 2011.   Hypercoagulable eval negative. Past Medical History:   Diagnosis Date    Atrial fibrillation with rapid ventricular response ([address] 10/15/2011    [address] by Dr. Marcelino Martel 2011    DVT (deep venous thrombosis) [facility]     duirng 4/08 around time of bowel surgery;  and again in 2011 after bowel surgery;  coagulopathy workup was OK    Embolism ([address] 4/1/2008    s/p small bowel surgery, at time of catheter removal reduced EF immediately, improved to normal on subsequent echos    PAF (paroxysmal atrial fibrillation) ([address]     present 15 years, had [address] in 2011 during admission for SBO    Stroke ([facility] 4/5/2008    air embolism after central line removal; s/p small bowel surgery    Ulcerative colitis     s/p total colectomy     Past Surgical History:   Procedure Laterality Date    CARDIOVERSION EXTERNAL  10/17/2011         HX COLECTOMY      total colectomy with IPAA for UC    HX LYSIS OF ADHESIONS      HX OTHER SURGICAL      Echo 10/11 - LVEF 55-60%, mild LAE, mild MR    HX SMALL BOWEL RESECTION      SD CARDIOVERSION ELECTIVE ARRHYTHMIA EXTERNAL  4/27/2018         SD EPHYS EVL TRNSPTL TX ATRIAL FIB ISOLAT PULM VEIN  4/6/2018         SD ICAR CATHETER ABLATION ARRHYTHMIA ADD ON  4/6/2018          Allergies   Allergen Reactions    Cephalosporins Other (comments)     Intrahepatic cholestasis with cephalosporin, bilirubin valentina as high as 6. NOT AN ALLERGY, but rather an adverse reaction. He could likely tolerate a short course if the risk < benefit. Current Outpatient Medications   Medication Sig    flecainide (TAMBOCOR) 100 mg tablet TAKE 1 TABLET BY MOUTH TWICE DAILY    aspirin delayed-release 81 mg tablet Take  by mouth daily.  multivitamin (ONE A DAY) tablet Take 1 Tab by mouth daily.  B INFANTIS/B ANI/B LUIS FERNANDO/B BIFID (PROBIOTIC 4X PO) Take  by mouth. No current facility-administered medications for this visit.         SOCIAL  FT employed in sales, lots of travelling  , 2 children  Non-smoker  ETOH- social, 2 times a week, beer  Exercises 5 times a week, 35-45 minutes- cardio and light weights/swim/jog  Caffeine- 2 cups of coffee a day  Eats a heart healthy diet, Mediterranean      Review of Systems:   Constitutional: Negative for fever, chills, weight loss  HEENT: Negative for nosebleeds, vision changes. Respiratory: Negative for cough, hemoptysis, sputum production, and wheezing. Cardiovascular: Negative for chest pain, + rare and short irregular palpitations, no orthopnea, claudication, leg swelling, syncope, and PND. Gastrointestinal: Negative for nausea, vomiting, diarrhea, constipation, blood in stool and melena. Genitourinary: Negative for dysuria, and hematuria. Musculoskeletal: Negative for myalgias, arthralgia. Skin: Negative for rash. Heme: Does not bleed or bruise easily. Neurological: Negative for speech change and focal weakness     Objective:     Due to this being a TeleHealth evaluation, many elements of the physical examination are unable to be assessed. General: Well developed, in no acute distress, cooperative and alert  HEENT: Pupils equal/round. No marked JVD visible on video. Respiratory: No audible wheezing, no signs of respiratory distress, lips non cyanotic  Extremities:  No edema  Neuro: A&Ox3, speech clear, no facial droop, answering questions appropriately  Skin: Non diaphoretic on visible skin during exam      Assessment/Plan:       ICD-10-CM ICD-9-CM    1. Persistent atrial fibrillation I48.19 427.31    2. S/P ablation of atrial fibrillation Z98.890 V45.89     Z86.79     3. Status post catheter ablation of atrial flutter Z98.890 V45.89    4. Paroxysmal atrial flutter (HCC) I48.92 427.32    5. LAE (left atrial enlargement) I51.7 429.3    6. Mitral valve insufficiency, unspecified etiology I34.0 424.0    7. Encounter for monitoring flecainide therapy Z51.81 V58.83     Z79.899 V58.69    8.  History of cardioversion Z98.890 V15.1           Mr. Dayanna Reich is s/p ablation of PAF & typical atrial flutter in April 2018, required synchronized cardioversion later that month. Subsequent holter showed no AF or AFL. No other known recurrence since then. He has continued flecainide, but no AV clare blocker due to resting bradycardia. Last ECG in 10/2019 showed no significant QRS prolongation. He feels that flecainide is helping if he takes BID he has few irregular beats and wants to stay on it  Previous DVT x 2 post surgery, negative hypercoagulable eval.  Prior CVA was due to air embolus. On ASA 81 mg po daily. Follow up in 6 months, will obtain ECG at that time to monitor QRSd. He will call sooner for new/worsening symptoms. If his PCP does ECG he will forward it    He is on crestor because carotid ultrasound reported mild plaques, given by his PCP  His calcium score was zero previously    Future Appointments   Date Time Provider Maryam Hines   4/27/2020 10:30 AM Prasanna Higgins  [address]       We discussed the expected course, resolution and complications of the diagnosis(es) in detail. Medication risks, benefits, costs, interactions, and alternatives were discussed as indicated. I advised him to contact the office if his condition worsens, changes or fails to improve as anticipated. He expressed understanding with the diagnosis(es) and plan. Patient was made aware and verbalized understanding that an appointment will be scheduled for them for a virtual visit and/or office visit within the above time frame. Patient understanding his/her responsibility to call and change time/date if he/she so chooses. Thank you for involving me in this patient's care and please call with further concerns or questions. Giovani Garrison M.D.  [facility]  Electrophysiology/Cardiology  [facility] [place]  [address], [place] [address], [address]  [address]  [phone]

## 2020-04-27 ENCOUNTER — VIRTUAL VISIT (OUTPATIENT)
Dept: CARDIOLOGY CLINIC | Age: 65
End: 2020-04-27

## 2020-04-27 DIAGNOSIS — I48.19 PERSISTENT ATRIAL FIBRILLATION (HCC): Primary | ICD-10-CM

## 2020-04-27 DIAGNOSIS — I48.92 PAROXYSMAL ATRIAL FLUTTER (HCC): ICD-10-CM

## 2020-04-27 DIAGNOSIS — Z98.890 STATUS POST CATHETER ABLATION OF ATRIAL FLUTTER: ICD-10-CM

## 2020-04-27 DIAGNOSIS — I51.7 LAE (LEFT ATRIAL ENLARGEMENT): ICD-10-CM

## 2020-04-27 DIAGNOSIS — Z51.81 ENCOUNTER FOR MONITORING FLECAINIDE THERAPY: ICD-10-CM

## 2020-04-27 DIAGNOSIS — Z86.79 S/P ABLATION OF ATRIAL FIBRILLATION: ICD-10-CM

## 2020-04-27 DIAGNOSIS — Z98.890 S/P ABLATION OF ATRIAL FIBRILLATION: ICD-10-CM

## 2020-04-27 DIAGNOSIS — Z98.890 HISTORY OF CARDIOVERSION: ICD-10-CM

## 2020-04-27 DIAGNOSIS — I34.0 MITRAL VALVE INSUFFICIENCY, UNSPECIFIED ETIOLOGY: ICD-10-CM

## 2020-04-27 DIAGNOSIS — Z79.899 ENCOUNTER FOR MONITORING FLECAINIDE THERAPY: ICD-10-CM

## 2020-04-27 RX ORDER — ROSUVASTATIN CALCIUM 10 MG/1
10 TABLET, COATED ORAL
COMMUNITY
End: 2020-10-29

## 2020-04-27 NOTE — PROGRESS NOTES
Verified patient with two types of identifiers. Scheduled 6 month follow up. Patient requested I send in the mail. Patient verbalized understanding and will call with any other questions.

## 2020-08-25 RX ORDER — FLECAINIDE ACETATE 100 MG/1
100 TABLET ORAL 2 TIMES DAILY
Qty: 180 TAB | Refills: 1 | Status: SHIPPED | OUTPATIENT
Start: 2020-08-25 | End: 2021-02-26

## 2020-08-25 NOTE — TELEPHONE ENCOUNTER
Request for Flecainide 100 mg BID. Last office visit 4/27/20, next office visit 10/29/20. Refills per verbal order from Dr. Kymberly Wright.

## 2020-10-29 ENCOUNTER — OFFICE VISIT (OUTPATIENT)
Dept: CARDIOLOGY CLINIC | Age: 65
End: 2020-10-29
Payer: COMMERCIAL

## 2020-10-29 VITALS
HEART RATE: 56 BPM | DIASTOLIC BLOOD PRESSURE: 82 MMHG | RESPIRATION RATE: 16 BRPM | HEIGHT: 77 IN | WEIGHT: 215 LBS | BODY MASS INDEX: 25.39 KG/M2 | SYSTOLIC BLOOD PRESSURE: 120 MMHG

## 2020-10-29 DIAGNOSIS — I48.19 PERSISTENT ATRIAL FIBRILLATION (HCC): Primary | ICD-10-CM

## 2020-10-29 DIAGNOSIS — I34.0 MITRAL VALVE INSUFFICIENCY, UNSPECIFIED ETIOLOGY: ICD-10-CM

## 2020-10-29 DIAGNOSIS — Z98.890 HISTORY OF CARDIOVERSION: ICD-10-CM

## 2020-10-29 DIAGNOSIS — Z86.79 S/P ABLATION OF ATRIAL FIBRILLATION: ICD-10-CM

## 2020-10-29 DIAGNOSIS — Z98.890 S/P ABLATION OF ATRIAL FIBRILLATION: ICD-10-CM

## 2020-10-29 DIAGNOSIS — Z51.81 ENCOUNTER FOR MONITORING FLECAINIDE THERAPY: ICD-10-CM

## 2020-10-29 DIAGNOSIS — Z98.890 STATUS POST CATHETER ABLATION OF ATRIAL FLUTTER: ICD-10-CM

## 2020-10-29 DIAGNOSIS — R00.1 SINUS BRADYCARDIA: ICD-10-CM

## 2020-10-29 DIAGNOSIS — I48.92 PAROXYSMAL ATRIAL FLUTTER (HCC): ICD-10-CM

## 2020-10-29 DIAGNOSIS — Z79.899 ENCOUNTER FOR MONITORING FLECAINIDE THERAPY: ICD-10-CM

## 2020-10-29 DIAGNOSIS — I07.1 TRICUSPID VALVE INSUFFICIENCY, UNSPECIFIED ETIOLOGY: ICD-10-CM

## 2020-10-29 PROCEDURE — 93000 ELECTROCARDIOGRAM COMPLETE: CPT | Performed by: INTERNAL MEDICINE

## 2020-10-29 PROCEDURE — 99214 OFFICE O/P EST MOD 30 MIN: CPT | Performed by: INTERNAL MEDICINE

## 2020-10-29 NOTE — PROGRESS NOTES
Cardiac Electrophysiology Office Note    Subjective:      Larance Osler is a 59 y.o. male patient who is seen today for a 6 month follow up of PAF & AFL  He is s/p ablation on 04/06/2018. He had recurrent AF/AFL shortly after ablation, required DCCV. Post ablation holter later showed PACs, PVCs, & PAT x 5 beats. No AF/AFL noted. Currently on flecainide 100 mg bid, no AV clare blocker due to tendency for resting bradycardia. Denies any palpitations or fatigue. Denies chest pain, shortness of breath, PND, orthopnea, lightheadedness, syncope, or edema. No OAC, on ASA 81 mg po daily. He feels that flecainide is helping if he takes BID   He works in [employer] and works from home      Previous:    Echo (07/12/2018): LVEF 93-63%, grade I diastolic dysfunction. LA mildly dilated (48 mm), RA mildly dilated. Trivial MR, AR, TR, & DE.    DCCV 04/27/2020. S/p AF/AFL ablation 04/06/2018. History of CVA related to air embolism. Admitted at [facility] for SBO at that time. Central line removed and within 2 minutes CP/SOB sx-found to be an air embolism. Left sided hemiparesis which resolved within a few days. Denied neuro residual deficits. Only took metoprolol and flecainide prn (pill in the pocket) before ablation. Calcium score reportedly 0. DVT post bowel surgery in 2008 & 2011. Hypercoagulable eval negative.     Problem List  Date Reviewed: 10/29/2020          Codes Class Noted    S/P ablation of atrial fibrillation ICD-10-CM: Z98.890, Z86.79  ICD-9-CM: V45.89  4/6/2018        Persistent atrial fibrillation ([facility] ICD-10-CM: I48.19  ICD-9-CM: 427.31  4/6/2018        Status post catheter ablation of atrial flutter ICD-10-CM: Z98.890  ICD-9-CM: V45.89  4/6/2018    Overview Signed 4/6/2018  5:10 PM by Afsaneh Diego MD     4/6/2018             Paroxysmal atrial flutter ([facility] ICD-10-CM: F98.30  ICD-9-CM: 427.32  9/24/2015        Atrial fibrillation with rapid ventricular response [facility]) ICD-10-CM: I48.91  ICD-9-CM: 427.31  10/15/2011    Overview Addendum 10/17/2011  6:50 PM by Lady Woody MD     10/15/2011, post op bowel obstruction  Cardioversion 10/17/2011 as he continued to have rapid afib despite cardizem to rate control             Other and unspecified hyperlipidemia ICD-10-CM: E78.5  ICD-9-CM: 272.4  10/19/2010        Palpitations ICD-10-CM: R00.2  ICD-9-CM: 785.1  10/14/2010                Past Medical History:   Diagnosis Date    Atrial fibrillation with rapid ventricular response ([address] 10/15/2011    [facility] by Dr. Merlyn Hicks 2011    DVT (deep venous thrombosis) ([address]     duirng 4/08 around time of bowel surgery;  and again in 2011 after bowel surgery;  coagulopathy workup was OK    Embolism ([address] 4/1/2008    s/p small bowel surgery, at time of catheter removal reduced EF immediately, improved to normal on subsequent echos    PAF (paroxysmal atrial fibrillation) ([address]     present 15 years, had [facility] in 2011 during admission for SBO    Stroke ([address] 4/5/2008    air embolism after central line removal; s/p small bowel surgery    Ulcerative colitis     s/p total colectomy     Past Surgical History:   Procedure Laterality Date    CARDIOVERSION EXTERNAL  10/17/2011         HX COLECTOMY      total colectomy with IPAA for UC    HX LYSIS OF ADHESIONS      HX OTHER SURGICAL      Echo 10/11 - LVEF 55-60%, mild LAE, mild MR    HX SMALL BOWEL RESECTION      [place] CARDIOVERSION ELECTIVE ARRHYTHMIA EXTERNAL  4/27/2018         [place] EPHYS EVL TRNSPTL TX ATRIAL FIB ISOLAT PULM VEIN  4/6/2018         [place] ICAR CATHETER ABLATION ARRHYTHMIA ADD ON  4/6/2018          Allergies   Allergen Reactions    Cephalosporins Other (comments)     Intrahepatic cholestasis with cephalosporin, bilirubin valentina as high as 6. NOT AN ALLERGY, but rather an adverse reaction. He could likely tolerate a short course if the risk < benefit.      Current Outpatient Medications   Medication Sig    flecainide (TAMBOCOR) 100 mg tablet Take 1 Tab by mouth two (2) times a day.  aspirin delayed-release 81 mg tablet Take  by mouth daily.  multivitamin (ONE A DAY) tablet Take 1 Tab by mouth daily.  rosuvastatin (CRESTOR) 10 mg tablet Take 10 mg by mouth nightly.  B INFANTIS/B ANI/B LUIS FERNANDO/B BIFID (PROBIOTIC 4X PO) Take  by mouth. No current facility-administered medications for this visit. SOCIAL  FT employed in sales, lots of travelling  , 2 children  Non-smoker  ETOH- social, 2 times a week, beer  Exercises 5 times a week, 35-45 minutes- cardio and light weights/swim/jog  Caffeine- 2 cups of coffee a day  Eats a heart healthy diet, Mediterranean      Review of Systems:   Constitutional: Negative for fever, chills, weight loss  HEENT: Negative for nosebleeds, vision changes. Respiratory: Negative for cough, hemoptysis, sputum production, and wheezing. Cardiovascular: Negative for chest pain, palpitations, no orthopnea, claudication, leg swelling, syncope, and PND. Gastrointestinal: Negative for nausea, vomiting, diarrhea, constipation, blood in stool and melena. Genitourinary: Negative for dysuria, and hematuria. Musculoskeletal: Negative for myalgias, arthralgia. Skin: Negative for rash. Heme: Does not bleed or bruise easily. Neurological: Negative for speech change and focal weakness     Objective:     Visit Vitals  /82 (BP 1 Location: Left arm, BP Patient Position: Sitting)   Pulse (!) 56   Resp 16   Ht 6' 5\" (1.956 m)   Wt 215 lb (97.5 kg)   BMI 25.50 kg/m²       Nursing note and vitals reviewed. Constitutional: well-developed and well-nourished. No distress. Tall  Head: Normocephalic and atraumatic. Eyes: Pupils are equal, round, and reactive to light. Neck: Neck supple. No JVD present. Cardiovascular regular rhythm and normal heart sounds. Exam reveals no gallop and no friction rub. No murmur heard. Pulmonary/Chest: Effort normal and breath sounds normal. No wheezes.    Abdominal: Soft, no rebound. Musculoskeletal: no edema and no tenderness. Neurological: alert,oriented. Skin: Skin is warm and dry, not diaphoretic. Psychiatric: normal mood and affect. Behavior is normal. Judgment and thought content normal.     ECG Sinus  Bradycardia   -Left axis deviation   -Poor R-wave progression    Low voltage in limb leads     Assessment/Plan:       ICD-10-CM ICD-9-CM    1. Persistent atrial fibrillation (HCC)  I48.19 427.31    2. Encounter for monitoring flecainide therapy  Z51.81 V58.83 AMB POC EKG ROUTINE W/ 12 LEADS, INTER & REP    Z79.899 V58.69    3. S/P ablation of atrial fibrillation  Z98.890 V45.89     Z86.79     4. Status post catheter ablation of atrial flutter  Z98.890 V45.89    5. Paroxysmal atrial flutter (HCC)  I48.92 427.32    6. Mitral valve insufficiency, unspecified etiology  I34.0 424.0    7. History of cardioversion  Z98.890 V15.1    8. Tricuspid valve insufficiency, unspecified etiology  I07.1 397.0    9. Sinus bradycardia  R00.1 427.89          Mr. Jarek Mcclendon is s/p ablation of PAF & typical atrial flutter in April 2018   Subsequent holter showed no AF or AFL when taking flecainide BID   He has continued and wanted flecainide, but no AV clare blocker due to resting bradycardia. ECG showed no significant QRS prolongation. He feels that flecainide is helping if he takes BID    Previous DVT x 2 post surgery, negative hypercoagulable eval.  Prior CVA was due to air embolus from central line removal at hospital.  On ASA 81 mg po daily. Follow up in 12 months, will obtain ECG at that time to monitor QRSd. He will call sooner for new/worsening symptoms.        He is on crestor because carotid ultrasound reported mild plaques, given by his PCP  His calcium score was zero previously     Future Appointments   Date Time Provider Maryam Flora   10/28/2021  8:20 AM MD RUTH Escobedo BS AMB       Thank you for involving me in this patient's care and please call with further concerns or questions. Ashley Loco M.D.  [facility]  Electrophysiology/Cardiology  [facility] [place]  [address], [address] , [address]  [facility], [address] [place] [address]  [phone]

## 2020-11-19 ENCOUNTER — TELEPHONE (OUTPATIENT)
Dept: CARDIOLOGY CLINIC | Age: 65
End: 2020-11-19

## 2020-11-19 NOTE — TELEPHONE ENCOUNTER
Faxed cardiac clearnace to [facility] at fax number [phone]. Fax confirmation received.

## 2021-01-04 NOTE — PERIOP NOTES
[address]  Endoscopy Preprocedure Instructions      1. On the day of your surgery, please report to registration located on the 2nd floor of the  [facility]. yes    2. You must have a responsible adult to drive you to the hospital, stay at the hospital during your procedure and drive you home. If they leave your procedure will not be started (no exceptions). yes    3. Do not have anything to eat or drink (including water, gum, mints, coffee, and juice) after midnight. This does not apply to the medications you were instructed to take by your physician. yesIf you are currently taking Plavix, Coumadin, Aspirin, or other blood-thinning agents, contact your physician for special instructions. yes,    4. If you are having a procedure that requires bowel prep: We recommend that you have only clear liquids the day before your procedure and begin your bowel prep by 5:00 pm.  You may continue to drink clear liquids until midnight. If for any reason you are not able to complete your prep please notify your physician immediately. yes    5. Have a list of all current medications, including vitamins, herbal supplements and any other over the counter medications. yes    6. If you wear glasses, contacts, dentures and/or hearing aids, they may be removed prior to procedure, please bring a case to store them in. yes    7. You should understand that if you do not follow these instructions your procedure may be cancelled. If your physical condition changes (I.e. fever, cold or flu) please contact your doctor as soon as possible. 8. It is important that you be on time.   If for any reason you are unable to keep your appointment please call )- the day of or your physicians office prior to your scheduled procedure

## 2021-01-08 ENCOUNTER — TRANSCRIBE ORDER (OUTPATIENT)
Dept: REGISTRATION | Age: 66
End: 2021-01-08

## 2021-01-08 ENCOUNTER — HOSPITAL ENCOUNTER (OUTPATIENT)
Dept: LAB | Age: 66
Discharge: HOME | End: 2021-01-08
Payer: MEDICARE

## 2021-01-08 DIAGNOSIS — Z01.812 PRE-PROCEDURAL LABORATORY EXAMINATIONS: ICD-10-CM

## 2021-01-08 DIAGNOSIS — Z01.812 PRE-PROCEDURAL LABORATORY EXAMINATIONS: Primary | ICD-10-CM

## 2021-01-08 PROCEDURE — 87635 SARS-COV-2 COVID-19 AMP PRB: CPT

## 2021-01-09 LAB — SARS-COV-2, COV2NT: NOT DETECTED

## 2021-01-12 ENCOUNTER — ANESTHESIA (OUTPATIENT)
Dept: ENDOSCOPY | Age: 66
End: 2021-01-12
Payer: MEDICARE

## 2021-01-12 ENCOUNTER — HOSPITAL ENCOUNTER (OUTPATIENT)
Age: 66
Setting detail: OUTPATIENT SURGERY
Discharge: HOME | End: 2021-01-12
Attending: SPECIALIST | Admitting: SPECIALIST
Payer: MEDICARE

## 2021-01-12 ENCOUNTER — ANESTHESIA EVENT (OUTPATIENT)
Dept: ENDOSCOPY | Age: 66
End: 2021-01-12
Payer: MEDICARE

## 2021-01-12 VITALS
OXYGEN SATURATION: 100 % | DIASTOLIC BLOOD PRESSURE: 78 MMHG | RESPIRATION RATE: 13 BRPM | HEART RATE: 53 BPM | BODY MASS INDEX: 24.36 KG/M2 | SYSTOLIC BLOOD PRESSURE: 117 MMHG | HEIGHT: 77 IN | WEIGHT: 206.35 LBS | TEMPERATURE: 97.9 F

## 2021-01-12 PROCEDURE — 74011250636 HC RX REV CODE- 250/636: Performed by: NURSE ANESTHETIST, CERTIFIED REGISTERED

## 2021-01-12 PROCEDURE — 76060000031 HC ANESTHESIA FIRST 0.5 HR: Performed by: SPECIALIST

## 2021-01-12 PROCEDURE — 76040000019: Performed by: SPECIALIST

## 2021-01-12 PROCEDURE — 77030021593 HC FCPS BIOP ENDOSC BSC -A: Performed by: SPECIALIST

## 2021-01-12 PROCEDURE — 2709999900 HC NON-CHARGEABLE SUPPLY: Performed by: SPECIALIST

## 2021-01-12 PROCEDURE — 88305 TISSUE EXAM BY PATHOLOGIST: CPT

## 2021-01-12 RX ORDER — PROPOFOL 10 MG/ML
INJECTION, EMULSION INTRAVENOUS
Status: DISCONTINUED | OUTPATIENT
Start: 2021-01-12 | End: 2021-01-12 | Stop reason: HOSPADM

## 2021-01-12 RX ORDER — DEXTROMETHORPHAN/PSEUDOEPHED 2.5-7.5/.8
1.2 DROPS ORAL
Status: DISCONTINUED | OUTPATIENT
Start: 2021-01-12 | End: 2021-01-12 | Stop reason: HOSPADM

## 2021-01-12 RX ORDER — NALOXONE HYDROCHLORIDE 0.4 MG/ML
0.4 INJECTION, SOLUTION INTRAMUSCULAR; INTRAVENOUS; SUBCUTANEOUS
Status: DISCONTINUED | OUTPATIENT
Start: 2021-01-12 | End: 2021-01-12 | Stop reason: HOSPADM

## 2021-01-12 RX ORDER — SODIUM CHLORIDE 9 MG/ML
50 INJECTION, SOLUTION INTRAVENOUS CONTINUOUS
Status: DISCONTINUED | OUTPATIENT
Start: 2021-01-12 | End: 2021-01-12 | Stop reason: HOSPADM

## 2021-01-12 RX ORDER — FLUMAZENIL 0.1 MG/ML
0.2 INJECTION INTRAVENOUS
Status: DISCONTINUED | OUTPATIENT
Start: 2021-01-12 | End: 2021-01-12 | Stop reason: HOSPADM

## 2021-01-12 RX ORDER — PROPOFOL 10 MG/ML
INJECTION, EMULSION INTRAVENOUS AS NEEDED
Status: DISCONTINUED | OUTPATIENT
Start: 2021-01-12 | End: 2021-01-12 | Stop reason: HOSPADM

## 2021-01-12 RX ORDER — SODIUM CHLORIDE 9 MG/ML
INJECTION, SOLUTION INTRAVENOUS
Status: DISCONTINUED | OUTPATIENT
Start: 2021-01-12 | End: 2021-01-12 | Stop reason: HOSPADM

## 2021-01-12 RX ORDER — MIDAZOLAM HYDROCHLORIDE 1 MG/ML
.25-5 INJECTION, SOLUTION INTRAMUSCULAR; INTRAVENOUS AS NEEDED
Status: DISCONTINUED | OUTPATIENT
Start: 2021-01-12 | End: 2021-01-12 | Stop reason: HOSPADM

## 2021-01-12 RX ORDER — FENTANYL CITRATE 50 UG/ML
25 INJECTION, SOLUTION INTRAMUSCULAR; INTRAVENOUS AS NEEDED
Status: DISCONTINUED | OUTPATIENT
Start: 2021-01-12 | End: 2021-01-12 | Stop reason: HOSPADM

## 2021-01-12 RX ADMIN — PROPOFOL 80 MG: 10 INJECTION, EMULSION INTRAVENOUS at 07:19

## 2021-01-12 RX ADMIN — SODIUM CHLORIDE: 9 INJECTION, SOLUTION INTRAVENOUS at 06:49

## 2021-01-12 RX ADMIN — PROPOFOL 120 MCG/KG/MIN: 10 INJECTION, EMULSION INTRAVENOUS at 07:18

## 2021-01-12 NOTE — INTERVAL H&P NOTE
Pre-Endoscopy H&P Update  Chief complaint/HPI/ROS:  The indication for the procedure, the patient's history and the patient's current medications are reviewed prior to the procedure and that data is reported on the H&P to which this document is attached. Any significant complaints with regard to organ systems will be noted, and if not mentioned then a review of systems is not contributory. Past Medical History:   Diagnosis Date    Atrial fibrillation with rapid ventricular response ([address] 10/15/2011    [address] by Dr. Diamante [facility]) 2020    prostate    DVT (deep venous thrombosis) ([facility] 2008/2011    duirng 4/08 around time of bowel surgery;  and again in 2011 after bowel surgery;  coagulopathy workup was OK    Embolism ([facility] 4/1/2008    s/p small bowel surgery, at time of catheter removal reduced EF immediately, improved to normal on subsequent echos    H/O atrioventricular clare ablation 2017    for afib    PAF (paroxysmal atrial fibrillation) ([facility]     present 15 years, had [address] in 2011 during admission for SBO    Stroke ([facility] 4/5/2008    air embolism after central line removal; s/p small bowel surgery    Ulcerative colitis 2001    s/p total colectomy      Past Surgical History:   Procedure Laterality Date    CARDIOVERSION EXTERNAL  10/17/2011         HX COLECTOMY      total colectomy with IPAA for UC    HX LYSIS OF ADHESIONS      HX OTHER SURGICAL      Echo 10/11 - LVEF 55-60%, mild LAE, mild MR    HX SMALL BOWEL RESECTION      AL CARDIAC SURG PROCEDURE UNLIST      AL CARDIOVERSION ELECTIVE ARRHYTHMIA EXTERNAL  4/27/2018         AL EPHYS EVL TRNSPTL TX ATRIAL FIB ISOLAT PULM VEIN  4/6/2018         AL ICAR CATHETER ABLATION ARRHYTHMIA ADD ON  4/6/2018          Social   Social History     Tobacco Use    Smoking status: Never Smoker    Smokeless tobacco: Never Used   Substance Use Topics    Alcohol use:  Yes     Alcohol/week: 5.0 standard drinks     Types: 4 Standard drinks or equivalent, 1 Glasses of wine per week     Comment: maybe once weekly      Family History   Problem Relation Age of Onset    Arrhythmia Sister       Allergies   Allergen Reactions    Cephalosporins Other (comments)     Intrahepatic cholestasis with cephalosporin, bilirubin valentina as high as 6. NOT AN ALLERGY, but rather an adverse reaction. He could likely tolerate a short course if the risk < benefit. Prior to Admission Medications   Prescriptions Last Dose Informant Patient Reported? Taking?   aspirin delayed-release 81 mg tablet 1/10/2021 at Unknown time  Yes Yes   Sig: Take  by mouth daily. flecainide (TAMBOCOR) 100 mg tablet 1/11/2021 at Unknown time  No Yes   Sig: Take 1 Tab by mouth two (2) times a day. multivitamin (ONE A DAY) tablet 1/10/2021 at Unknown time  Yes Yes   Sig: Take 1 Tab by mouth daily. Facility-Administered Medications: None       PHYSICAL EXAM:  The patient is examined immediately prior to the procedure. Visit Vitals  /74   Pulse (!) 58   Temp 97.9 °F (36.6 °C)   Resp 10   Ht 6' 5\" (1.956 m)   Wt 93.6 kg (206 lb 5.6 oz)   SpO2 98%   BMI 24.47 kg/m²     Gen: Appears comfortable, no distress. Pulm: comfortable respirations with no abnormal audible breath sounds  HEART: well perfused, no abnormal audible heart sounds  GI: abdomen flat. PLAN:  Informed consent discussion held, patient afforded an opportunity to ask questions and all questions answered. After being advised of the risks, benefits, and alternatives, the patient requested that we proceed and indicated so on a written consent form. Will proceed with procedure as planned.   Danitza Montanez MD

## 2021-01-12 NOTE — PERIOP NOTES
7073  Anesthesia staff at patient's bedside administering anesthesia and monitoring patients vital signs throughout procedure. See anesthesia note. Post procedure, report received from Amalia SOLORZANO    4070  Endoscope was pre-cleaned at bedside immediately following procedure by [employer] Cassandra bourne. 9230  Patient tolerated procedure. Abdomen soft and patient arousable and voices no complaints. Patient transported to endoscopy recovery area. Report given to post procedure RNJam. Message left on the patient's answering machine that she does not need to proceed with the colonoscopy if she is not comfortable with it. She is aware that her last polyp was an adenoma which puts her at risk for future adenomas. This was discussed at office visit also. We will reschedule her endoscopy which is due at the end of the year.    Schedule Procedure:     Please Schedule For October or November  Procedure: EGD (81184)  Diagnosis: Rojas's Esophagus K22.70  Is patient:    Diabetic? No   On Coumadin? No   On ASA/NSAIDS? No  Prophylactic Antibiotics? No  Location: [facility]  Special Instructions:   IV Anesthesia  With Dr. Crow

## 2021-01-12 NOTE — ANESTHESIA PREPROCEDURE EVALUATION
Anesthetic History   No history of anesthetic complications            Review of Systems / Medical History  Patient summary reviewed, nursing notes reviewed and pertinent labs reviewed    Pulmonary              Pertinent negatives: No COPD, asthma and recent URI     Neuro/Psych       CVA  TIA     Cardiovascular            Dysrhythmias : atrial fibrillation      Exercise tolerance: >4 METS  Comments: Ablation 2 years ago   GI/Hepatic/Renal               Comments: Ulcerative colitis Endo/Other             Other Findings   Comments: S/p colonic resection, has J pouch, multiple surgeries since for SBOs             Physical Exam    Airway  Mallampati: II  TM Distance: > 6 cm  Neck ROM: normal range of motion   Mouth opening: Normal     Cardiovascular    Rhythm: regular  Rate: normal         Dental    Dentition: Lower dentition intact and Upper dentition intact     Pulmonary  Breath sounds clear to auscultation               Abdominal         Other Findings            Anesthetic Plan    ASA: 2  Anesthesia type: general          Induction: Intravenous  Anesthetic plan and risks discussed with: Patient

## 2021-01-12 NOTE — ROUTINE PROCESS
Ricki Ota  1955  [id number]    Situation:  Verbal report received from: Shandra Claros  Procedure: Procedure(s):  sigmoidoscopy  COLON BIOPSY    Background:    Preoperative diagnosis: ULCERATIVE COLITIS  POUCHITIS  OTHER SPECIFIED DISEASES OF ANUS AND RECTUM  Postoperative diagnosis: * No post-op diagnosis entered *    :  Dr. Cami Reina  Assistant(s): Endoscopy Technician-1: Rosa Espinoza  Endoscopy RN-1: Orquidea Mancilla RN    Specimens:   ID Type Source Tests Collected by Time Destination   1 : ileul pouch biopsy Preservative   Tonja De Paz MD 1/12/2021 6703 Pathology     H. Pylori  no    Assessment:  Intra-procedure medications     Anesthesia gave intra-procedure sedation and medications, see anesthesia flow sheet yes    Intravenous fluids: NS@ KVO     Vital signs stable     Abdominal assessment: round and soft     Recommendation:  Discharge patient per MD order. Family-wife  Permission to share finding with family or friend yes    Endoscopy discharge instructions have been reviewed and given to patient. The patient verbalized understanding and acceptance of instructions. Dr. Cami Reina discussed with spouse procedure findings and next steps.

## 2021-01-12 NOTE — PROCEDURES
[address] [facility] RENAY Ortiz MD  [phone]      2021    Ileoscopy Procedure Note  Paul Joseph  :  1955  [facility] Medical Record Number: [id number]    Indications:     Ulcerative colitis, s/p colectomy and IPAA/ \"J pouch\". For surveillance examination. Symptoms at baseline and tolerable - frequent BM but no bleeding no pain no weight loss. PCP:  Harini Barakat MD  Anesthesia/Sedation: Conscious Sedation/Moderate Sedation/GETA, see notes  Endoscopist:  Dr. Robin Morin  Complications:  None  Estimated Blood Loss:  None    Permit:  The indications, risks, benefits and alternatives were reviewed with the patient or their decision maker who was provided an opportunity to ask questions and all questions were answered. The specific risks of colonoscopy with conscious sedation were reviewed, including but not limited to anesthetic complication, bleeding, adverse drug reaction, missed lesion, infection, IV site reactions, and intestinal perforation which would lead to the need for surgical repair. Alternatives to colonoscopy including radiographic imaging, observation without testing, or laboratory testing were reviewed including the limitations of those alternatives. After considering the options and having all their questions answered, the patient or their decision maker provided both verbal and written consent to proceed. Procedure in Detail:  After obtaining informed consent, positioning of the patient in the left lateral decubitus position, and conduction of a pre-procedure pause or \"time out\" the endoscope was introduced into the anus and advanced to the ileum. The quality of the preparation was excellent. A careful inspection was made as the colonoscope was withdrawn, findings and interventions are described below. Findings:   The patient is s/p IPAA.   The anastomosis is healthy without inflammation or neoplastic appearing epithelium. In the distal pouch there are several small superficial ulcerations. I had thought this might be changes due to recent prostate biopsy but they are scattered along th elength of the pouch to the apex for a distance of about 10cm. I suspect this is not sequela of recent prostate biopsy but rather mild pouchitis. Cold forceps biopsies taken for histology. Specimens:    See above    Complications:   None; patient tolerated the procedure well. Impression:  Mild pouchitis - this would give some concern for possible pouch complication were radiotherapy considered for his recent diagnosis of prostatce carcinoma. Recommendations:      - If symptoms develop would give course of antibiotics for this finding, but as he is asymptomatic at this time I don't think this is required. I would provide treatment if the ultimate plan were to proceed with radiotherapy but he is in the information gathering phase of his decision making in regards his prostate carcinoma. Not certain if his ultimate decision will be radiotherapy, observation, or surgery. Thank you for entrusting me with this patient's care. Please do not hesitate to contact me with any questions or if I can be of assistance with any of your other patients' GI needs. Signed By: Otto Kessler MD                        January 12, 2021      Surgical assistant none. Implants none unless specified.

## 2021-01-12 NOTE — DISCHARGE INSTRUCTIONS
[address] [facility] RENAY Schreiber MD  [phone]      January 12, 2021    Lamar Cadena  YOB: 1955    COLONOSCOPY DISCHARGE INSTRUCTIONS    If there is redness at IV site you should apply warm compress to area. If redness or soreness persist contact Dr. Ernesto Schreiber' or your primary care doctor. There may be a slight amount of blood passed from the rectum. Gaseous discomfort may develop, but walking, belching will help relieve this. You may not operate a vehicle for 12 hours  You may not operate machinery or dangerous appliances for rest of today  You may not drink alcoholic beverages for 12 hours  Avoid making any critical decisions for 24 hours    DIET:  You may resume your normal diet, but some patients find that heavy or large meals may lead to indigestion or vomiting. I suggest a light meal as first food intake. MEDICATIONS:  The use of some over-the-counter pain medication may lead to bleeding after colon biopsies or polyp removal.  Tylenol (also called acetaminophen) is safe to take even if you have had colonoscopy with polyp removal.  Based on the procedure you had today you may not safely take aspirin or aspirin-like products for the next ten (10) days. Remember that Tylenol (also called acetaminophen) is safe to take after colonoscopy even if you have had biopsies or polyps removed. ACTIVITY:  You may resume your normal household activities, but it is recommended that you spend the remainder of the day resting -  avoid any strenuous activity.     CALL DR. Ashanti Allen' OFFICE IF:  Increasing pain, nausea, vomiting  Abdominal distension (swelling)  Significant new or increased bleeding (oral or rectal)  Fever/Chills  Chest pain/shortness of breath                       Additional instructions:   No aspirin 10 days - we found mild inflammation in the pouch and took small biopsies and I'll contact you with those results in about a week. Even though we saw some inflammation my inclination would be to not give any treatment unless you develop symptoms; or if you decide you will proceed with radiation treatment I would suggest treatment prior to that. It was an honor to be your doctor today. Please let me or my office staff know if you have any feedback about today's procedure. Tavia Carroll MD    Colonoscopy saves lives, and can prevent colon cancer. Everyone aged 48 or older needs colonoscopy.   Tell your family and friends: get the test!

## 2021-01-12 NOTE — H&P
Date: 2020 1:00 PM   Patient Name: Ruthy Salomon   Account #: [id number]    Gender: Male    (age): 1955 (72)       Provider:     Dary Batista NP        Referring Physician:     Will Acosta. Lor Trotter MD  . [address], [place], [address]  [phone] (phone)  [phone] (fax)     Tonio Estrada MD  [address] [facility], [address] [place] [address]  [phone] (phone)  [phone] (fax)        Chief Complaint: Ulcerative Colitis           History of Present Illness:   Ulcerative Colitis s/p IPAA. H/O pouchitis responsive to Cipro. He has been on VSL#3 in the past but not currently. He reports that he has recently had some discomfort more than usual. No blood or mucous in the stool. He is having 4-6 BMs per day, loose. No unexplained weight loss. Yesterday diagnosed with prostate CA. Wants to check with us to see if he could have radiation? Alternatives were provided including prostatectomy (which would be open) or cryotherapy or active surveillance. Dr. Lor Trotter will send information to [facility]. Last Colonoscopy 2019 with pouchitis ? Ulcerative Colitis s/p IPAA. H/O pouchitis responsive to Cipro. He has been on VSL#3 in the past but not currently. He reports that he has recently had some discomfort more than usual. No blood or mucous in the stool. He is having 4-6 BMs per day, loose. No unexplained weight loss. Yesterday diagnosed with prostate CA. Wants to check with us to see if he could have radiation? Alternatives were provided including prostatectomy (which would be open) or cryotherapy or active surveillance. Dr. Lor Trotter will send information to [facility]. Last Colonoscopy 2019? with pouchitis       Past Medical History      Medical Conditions: Atrial Fibrillation  Hx Ulcerative Colitis   Surgical Procedures: Multiple Small Bowel Obstruction Surgeries  Colon Resection, ?    Dx Studies: Colonoscopy, 2019, Previous Surgery in the colon and Granularity, friability and erythema in the J pouch and rectal cuff compatible with pouchitis. (Biopsy)  Sigmoidoscopy, 11/19/2018, Previous Surgery in the colon and Erythematous and nodular mucosa of rectum just proximal to dentate line in the colon compatible with proctitis. (Biopsy)   Medications: aspirin 81 mg Take 1 tablet by mouth once a day  flecainide 100 mg Take 1 tablet by mouth once a day   Allergies: Patient has no known allergies or drug allergies   Immunizations: zoster, 12/01/2020  Influenza, seasonal, injectable, 10/01/2020      Social History      Alcohol: Alcohol consumption: Weekly. Tobacco: Never smoker   Drugs: None   Exercise: Exercise 3 or more times a week. Caffeine: Daily. Marital Status:          Occupation:               Family History No history of Colon Cancer, Colon Polyps, Esophogeal Cancer, GI Cancers, IBD (Crohn's or UC), Liver disease  Sister: Diagnosed with Breast Cancer; Father: Diagnosed with Prostate Cancer;   Brother: Diagnosed with Prostate Cancer;       Review of Systems:   Cardiovascular: Denies chest pain, irregular heart beat, palpitations, peripheral edema, syncope, Sweats. Constitutional: Denies fatigue, fever, loss of appetite, weight gain, weight loss. ENMT: Denies nose bleeds, sore throat, hearing loss. Endocrine: Denies excessive thirst, heat intolerance. Eyes: Denies loss of vision. Gastrointestinal: Denies abdominal pain, abdominal swelling, change in bowel habits, constipation, diarrhea, Bloating/gas, heartburn, jaundice, nausea, rectal bleeding, stomach cramps, vomiting, dysphagia, rectal pain, Stool incontinence, hematemesis. Genitourinary: Denies dark urine, dysuria, frequent urination, hematuria, incontinence. Hematologic/Lymphatic: Denies easy bruising, prolonged bleeding. Integumentary: Denies itching, rashes, sun sensitivity. Musculoskeletal: Denies arthritis, back pain, gout, joint pain, muscle weakness, stiffness.    Neurological: Denies dizziness, fainting, frequent headaches, memory loss. Psychiatric: Denies anxiety, depression, difficulty sleeping, hallucinations, nervousness, panic attacks, paranoia. Respiratory: Denies cough, dyspnea, wheezing. Vital Signs:   BP  (mmHg)  Pulse  (ppm) Weight (lbs/oz) Height (ft/in) BMI Temp   118/84 89 216 / 4 6 / 5 25.64 97.5 (F)      Physical Exam:   Constitutional:      Appearance: No distress, appears comfortable. Communication: Understands/receives spoken information. Head/face: Inspection: Normacephalic, atraumatic. Palpation: normal.   Eyes:      Conjunctivae/lids: Normal.   Pupils/irises: Pupils equal, round and normal.   Respiratory:      Effort: Normal respiratory effort, comfortable, speaks in complete sentences. Auscultation: normal breath sounds, no rubs, wheezes or rhonchi. Cardiovascular: Auscultation: normal, S1 and S2, no gallops , no rubs or murmurs . Peripheral: no edema. Gastrointestinal/Abdomen:      Liver/Spleen: normal, normal size, Liver size and consistency normal, spleen is non-palpable. Abdomen Exam: normal bowel sounds, non distended, non tender. Psychiatric:      Judgment/insight: Normal, normal judgement, normal insight. Orientation: oriented to time, space and person. Lab Results: No Electronic Results      Impressions: Ulcerative colitis, unspecified, without complications - s/p total proctocolectomy and IPAA  Pouchitis, history of  Malignant tumor of prostate? ? Ulcerative colitis, unspecified, without complications? - s/p total proctocolectomy and IPAA?  ? ?Pouchitis? , history of?  ? ?Malignant tumor of prostate? Assessment: Mr. Serg Smith has a h/o UC with pouch and h/o pouchitis. He is not having any UC/pouchitis symptoms at present but is due for screening with colonoscopy/pouchoscopy to inspect rectal cuff.  Additionally he has been diagnosed with prostate CA and has been told he may not be a candidate for radiation given his surgical history. I don't know this to be a total contraindication, but will consult with Dr. Cory Farrar regarding this. ? Mr. Shiela Phoenix has a h/o UC with pouch and h/o pouchitis. He is not having any UC/pouchitis symptoms at present but is due for screening with colonoscopy/pouchoscopy to inspect rectal cuff. Additionally he has been diagnosed with prostate CA and has been told he may not be a candidate for radiation given his surgical history. I don't know this to be a total contraindication, but will consult with Dr. Cory Farrar regarding this. Plan: Colonoscopy - pouchoscopy   Follow-up post procedure? ? Colonoscopy? - pouchoscopy ? ? ? ? Follow-up post procedure? ? Risk & Medical Necessity: The patient requires Moderate to High Severity care for this visit. Diagnosis and management options are Multiple. The amount of data reviewed and/or ordered is Moderate. The level of risk is Moderate. Notes:              Simon Callander, NP     Electronically signed on 2020 1:33:58 PM by Simon Callander, NP                                 Addenda         [place] Ask.  Shiela Phoenix, MRN [id number],  1955 IPP Follow Up, Thursday, 2020                                                                                                                                                        New     Modify          Delete     Delete all     Edit Wording          Sign     [email or web address]

## 2021-01-13 NOTE — ANESTHESIA POSTPROCEDURE EVALUATION
Procedure(s):  sigmoidoscopy  COLON BIOPSY. MAC    Anesthesia Post Evaluation      Multimodal analgesia: multimodal analgesia used between 6 hours prior to anesthesia start to PACU discharge  Patient location during evaluation: PACU  Patient participation: complete - patient participated  Level of consciousness: awake and alert  Pain management: adequate  Airway patency: patent  Anesthetic complications: no  Cardiovascular status: acceptable  Respiratory status: acceptable  Hydration status: acceptable  Post anesthesia nausea and vomiting:  none      INITIAL Post-op Vital signs:   Vitals Value Taken Time   /78 01/12/21 0758   Temp 36.6 °C (97.9 °F) 01/12/21 0734   Pulse 52 01/12/21 0801   Resp 13 01/12/21 0801   SpO2 100 % 01/12/21 0801   Vitals shown include unvalidated device data.

## 2021-02-26 RX ORDER — FLECAINIDE ACETATE 100 MG/1
TABLET ORAL
Qty: 180 TAB | Refills: 1 | Status: SHIPPED | OUTPATIENT
Start: 2021-02-26 | End: 2021-10-13

## 2021-02-26 NOTE — TELEPHONE ENCOUNTER
Received refill request for flecainide 100 mg tabs.  ECG in 10/2020 showed QRSd approx 110 ms, similar to previous.  Refill authorized.    Future Appointments   Date Time Provider Department Center   10/28/2021  8:20 AM Jalen Díaz MD [facility]

## 2021-10-13 RX ORDER — FLECAINIDE ACETATE 100 MG/1
TABLET ORAL
Qty: 180 TABLET | Refills: 1 | Status: SHIPPED | OUTPATIENT
Start: 2021-10-13 | End: 2022-05-25

## 2021-10-13 NOTE — TELEPHONE ENCOUNTER
Received refill request for flecainide 100 mg po tabs. Last ECG in 10/2020 showed narrow QRS. Labs out of date. Will need ECG & CMP with upcoming visit. Refill authorized.     Future Appointments   Date Time Provider Maryam Hines   10/28/2021  8:20 AM MD RUTH [place] [facility]

## 2021-10-25 ENCOUNTER — TELEPHONE (OUTPATIENT)
Dept: CARDIOLOGY CLINIC | Age: 66
End: 2021-10-25

## 2021-10-26 NOTE — TELEPHONE ENCOUNTER
Returned patient call and identity verified by two identifiers. Rescheduled patient cancelled appt with  on 10/28/21 to 12/9/2021, as requested. Patient verbalized all understanding and had no additional questions.     Future Appointments   Date Time Provider Maryam Hines   12/9/2021  3:20 PM MD RUTH [facility]

## 2021-12-09 ENCOUNTER — OFFICE VISIT (OUTPATIENT)
Dept: CARDIOLOGY CLINIC | Age: 66
End: 2021-12-09
Payer: MEDICARE

## 2021-12-09 VITALS
WEIGHT: 218 LBS | SYSTOLIC BLOOD PRESSURE: 120 MMHG | BODY MASS INDEX: 25.74 KG/M2 | OXYGEN SATURATION: 98 % | DIASTOLIC BLOOD PRESSURE: 88 MMHG | HEIGHT: 77 IN | HEART RATE: 64 BPM | RESPIRATION RATE: 16 BRPM

## 2021-12-09 DIAGNOSIS — Z98.890 HISTORY OF CARDIOVERSION: ICD-10-CM

## 2021-12-09 DIAGNOSIS — Z86.79 S/P ABLATION OF ATRIAL FIBRILLATION: ICD-10-CM

## 2021-12-09 DIAGNOSIS — I34.0 MITRAL VALVE INSUFFICIENCY, UNSPECIFIED ETIOLOGY: ICD-10-CM

## 2021-12-09 DIAGNOSIS — I48.19 PERSISTENT ATRIAL FIBRILLATION (HCC): Primary | ICD-10-CM

## 2021-12-09 DIAGNOSIS — I48.92 PAROXYSMAL ATRIAL FLUTTER (HCC): ICD-10-CM

## 2021-12-09 DIAGNOSIS — Z98.890 S/P ABLATION OF ATRIAL FIBRILLATION: ICD-10-CM

## 2021-12-09 PROCEDURE — G8427 DOCREV CUR MEDS BY ELIG CLIN: HCPCS | Performed by: INTERNAL MEDICINE

## 2021-12-09 PROCEDURE — G0463 HOSPITAL OUTPT CLINIC VISIT: HCPCS | Performed by: INTERNAL MEDICINE

## 2021-12-09 PROCEDURE — 1101F PT FALLS ASSESS-DOCD LE1/YR: CPT | Performed by: INTERNAL MEDICINE

## 2021-12-09 PROCEDURE — G8419 CALC BMI OUT NRM PARAM NOF/U: HCPCS | Performed by: INTERNAL MEDICINE

## 2021-12-09 PROCEDURE — 93005 ELECTROCARDIOGRAM TRACING: CPT | Performed by: INTERNAL MEDICINE

## 2021-12-09 PROCEDURE — 93010 ELECTROCARDIOGRAM REPORT: CPT | Performed by: INTERNAL MEDICINE

## 2021-12-09 PROCEDURE — G8536 NO DOC ELDER MAL SCRN: HCPCS | Performed by: INTERNAL MEDICINE

## 2021-12-09 PROCEDURE — G9711 PT HX TOT COL OR COLON CA: HCPCS | Performed by: INTERNAL MEDICINE

## 2021-12-09 PROCEDURE — G8510 SCR DEP NEG, NO PLAN REQD: HCPCS | Performed by: INTERNAL MEDICINE

## 2021-12-09 PROCEDURE — 99214 OFFICE O/P EST MOD 30 MIN: CPT | Performed by: INTERNAL MEDICINE

## 2022-01-14 NOTE — PERIOP NOTES
Informed patient of COVID requirements, patient to complete COVID curbside testing at [facility] Tuesday, January 18th between [id number]. Patient verbalized understanding that COVID test is required to proceed with procedure.

## 2022-01-18 ENCOUNTER — HOSPITAL ENCOUNTER (OUTPATIENT)
Dept: LAB | Age: 67
Discharge: HOME | End: 2022-01-18
Payer: MEDICARE

## 2022-01-18 ENCOUNTER — TRANSCRIBE ORDER (OUTPATIENT)
Dept: REGISTRATION | Age: 67
End: 2022-01-18

## 2022-01-18 DIAGNOSIS — Z01.812 PRE-PROCEDURAL LABORATORY EXAMINATIONS: ICD-10-CM

## 2022-01-18 DIAGNOSIS — Z01.812 PRE-PROCEDURAL LABORATORY EXAMINATIONS: Primary | ICD-10-CM

## 2022-01-18 PROCEDURE — U0005 INFEC AGEN DETEC AMPLI PROBE: HCPCS

## 2022-01-19 LAB
SARS-COV-2, XPLCVT: NOT DETECTED
SOURCE, COVRS: NORMAL

## 2022-01-21 ENCOUNTER — ANESTHESIA (OUTPATIENT)
Dept: ENDOSCOPY | Age: 67
End: 2022-01-21
Payer: MEDICARE

## 2022-01-21 ENCOUNTER — ANESTHESIA EVENT (OUTPATIENT)
Dept: ENDOSCOPY | Age: 67
End: 2022-01-21
Payer: MEDICARE

## 2022-01-21 ENCOUNTER — HOSPITAL ENCOUNTER (OUTPATIENT)
Age: 67
Setting detail: OUTPATIENT SURGERY
Discharge: HOME | End: 2022-01-21
Attending: SPECIALIST | Admitting: SPECIALIST
Payer: MEDICARE

## 2022-01-21 VITALS
RESPIRATION RATE: 14 BRPM | WEIGHT: 215.17 LBS | SYSTOLIC BLOOD PRESSURE: 101 MMHG | BODY MASS INDEX: 25.41 KG/M2 | HEART RATE: 54 BPM | HEIGHT: 77 IN | OXYGEN SATURATION: 98 % | TEMPERATURE: 97.9 F | DIASTOLIC BLOOD PRESSURE: 74 MMHG

## 2022-01-21 PROCEDURE — 74011250636 HC RX REV CODE- 250/636: Performed by: NURSE ANESTHETIST, CERTIFIED REGISTERED

## 2022-01-21 PROCEDURE — 76060000031 HC ANESTHESIA FIRST 0.5 HR: Performed by: SPECIALIST

## 2022-01-21 PROCEDURE — 2709999900 HC NON-CHARGEABLE SUPPLY: Performed by: SPECIALIST

## 2022-01-21 PROCEDURE — 88305 TISSUE EXAM BY PATHOLOGIST: CPT

## 2022-01-21 PROCEDURE — 76040000019: Performed by: SPECIALIST

## 2022-01-21 PROCEDURE — 74011000250 HC RX REV CODE- 250: Performed by: NURSE ANESTHETIST, CERTIFIED REGISTERED

## 2022-01-21 PROCEDURE — 77030021593 HC FCPS BIOP ENDOSC BSC -A: Performed by: SPECIALIST

## 2022-01-21 RX ORDER — FLUMAZENIL 0.1 MG/ML
0.2 INJECTION INTRAVENOUS
Status: DISCONTINUED | OUTPATIENT
Start: 2022-01-21 | End: 2022-01-21 | Stop reason: HOSPADM

## 2022-01-21 RX ORDER — CYANOCOBALAMIN 1000 UG/ML
1000 INJECTION, SOLUTION INTRAMUSCULAR; SUBCUTANEOUS
COMMUNITY

## 2022-01-21 RX ORDER — LIDOCAINE HYDROCHLORIDE 20 MG/ML
INJECTION, SOLUTION EPIDURAL; INFILTRATION; INTRACAUDAL; PERINEURAL AS NEEDED
Status: DISCONTINUED | OUTPATIENT
Start: 2022-01-21 | End: 2022-01-21 | Stop reason: HOSPADM

## 2022-01-21 RX ORDER — NALOXONE HYDROCHLORIDE 0.4 MG/ML
0.4 INJECTION, SOLUTION INTRAMUSCULAR; INTRAVENOUS; SUBCUTANEOUS
Status: DISCONTINUED | OUTPATIENT
Start: 2022-01-21 | End: 2022-01-21 | Stop reason: HOSPADM

## 2022-01-21 RX ORDER — MIDAZOLAM HYDROCHLORIDE 1 MG/ML
.25-5 INJECTION, SOLUTION INTRAMUSCULAR; INTRAVENOUS AS NEEDED
Status: DISCONTINUED | OUTPATIENT
Start: 2022-01-21 | End: 2022-01-21 | Stop reason: HOSPADM

## 2022-01-21 RX ORDER — PROPOFOL 10 MG/ML
INJECTION, EMULSION INTRAVENOUS
Status: DISCONTINUED | OUTPATIENT
Start: 2022-01-21 | End: 2022-01-21 | Stop reason: HOSPADM

## 2022-01-21 RX ORDER — SODIUM CHLORIDE 9 MG/ML
50 INJECTION, SOLUTION INTRAVENOUS CONTINUOUS
Status: DISCONTINUED | OUTPATIENT
Start: 2022-01-21 | End: 2022-01-21 | Stop reason: HOSPADM

## 2022-01-21 RX ORDER — DEXTROMETHORPHAN/PSEUDOEPHED 2.5-7.5/.8
1.2 DROPS ORAL
Status: DISCONTINUED | OUTPATIENT
Start: 2022-01-21 | End: 2022-01-21 | Stop reason: HOSPADM

## 2022-01-21 RX ORDER — SODIUM CHLORIDE 9 MG/ML
INJECTION, SOLUTION INTRAVENOUS
Status: DISCONTINUED | OUTPATIENT
Start: 2022-01-21 | End: 2022-01-21 | Stop reason: HOSPADM

## 2022-01-21 RX ORDER — FENTANYL CITRATE 50 UG/ML
25 INJECTION, SOLUTION INTRAMUSCULAR; INTRAVENOUS AS NEEDED
Status: DISCONTINUED | OUTPATIENT
Start: 2022-01-21 | End: 2022-01-21 | Stop reason: HOSPADM

## 2022-01-21 RX ORDER — PROPOFOL 10 MG/ML
INJECTION, EMULSION INTRAVENOUS AS NEEDED
Status: DISCONTINUED | OUTPATIENT
Start: 2022-01-21 | End: 2022-01-21 | Stop reason: HOSPADM

## 2022-01-21 RX ADMIN — PROPOFOL INJECTABLE EMULSION 50 MG: 10 INJECTION, EMULSION INTRAVENOUS at 14:58

## 2022-01-21 RX ADMIN — PROPOFOL INJECTABLE EMULSION 80 MG: 10 INJECTION, EMULSION INTRAVENOUS at 14:47

## 2022-01-21 RX ADMIN — SODIUM CHLORIDE: 900 INJECTION, SOLUTION INTRAVENOUS at 14:47

## 2022-01-21 RX ADMIN — PROPOFOL INJECTABLE EMULSION 100 MG: 10 INJECTION, EMULSION INTRAVENOUS at 14:50

## 2022-01-21 RX ADMIN — PROPOFOL INJECTABLE EMULSION 20 MG: 10 INJECTION, EMULSION INTRAVENOUS at 14:49

## 2022-01-21 RX ADMIN — PROPOFOL INJECTABLE EMULSION 20 MG: 10 INJECTION, EMULSION INTRAVENOUS at 14:55

## 2022-01-21 RX ADMIN — PROPOFOL INJECTABLE EMULSION 30 MG: 10 INJECTION, EMULSION INTRAVENOUS at 14:53

## 2022-01-21 RX ADMIN — PROPOFOL 140 MCG/KG/MIN: 10 INJECTION, EMULSION INTRAVENOUS at 14:58

## 2022-01-21 RX ADMIN — LIDOCAINE HYDROCHLORIDE 100 MG: 20 INJECTION, SOLUTION INTRAVENOUS at 14:47

## 2022-01-21 NOTE — PROGRESS NOTES
Swapnil Essex  1955  [id number]    Situation:  Verbal report received from: Katelyn Bassett RN   Procedure: Procedure(s):  ESOPHAGOGASTRODUODENOSCOPY (EGD)  POUCHOSCOPY  ESOPHAGOGASTRODUODENAL (EGD) BIOPSY  COLON BIOPSY    Background:    Preoperative diagnosis: Candidal esophagitis (HCC) [B37.81]  Postoperative diagnosis: 1.- normal EGD  2.- pouchitis    :  Dr. Peterson Do   Assistant(s): Endoscopy RN-1: Terri Meier  Endoscopy RN-2: Petrona Dumas RN    Specimens:   ID Type Source Tests Collected by Time Destination   1 : gastric antrum biopsy Preservative   Erendira Gupta MD 1/21/2022 1457 Pathology   2 : angularis biopsy Preservative   Erendira Gupta MD 1/21/2022 1457 Pathology   3 : gastric body biopsy Preservative   Erendira Gupta MD 1/21/2022 1457 Pathology   4 : Ileum pouch biopsy Preservative   Erendira Gupta MD 1/21/2022 1500 Pathology     H. Pylori  no    Assessment:    Anesthesia gave intra-procedure sedation and medications, see anesthesia flow sheet no    Intravenous fluids: NS@ KVO     Vital signs stable     Abdominal assessment: round and soft     Recommendation:  Discharge patient per MD order.   Return to floor  Family or Friend   Permission to share finding with family or friend yes

## 2022-01-21 NOTE — INTERVAL H&P NOTE
Pre-Endoscopy H&P Update  Chief complaint/HPI/ROS:  The indication for the procedure, the patient's history and the patient's current medications are reviewed prior to the procedure and that data is reported on the H&P to which this document is attached. Any significant complaints with regard to organ systems will be noted, and if not mentioned then a review of systems is not contributory.   Past Medical History:   Diagnosis Date    Atrial fibrillation with rapid ventricular response ([address] 10/15/2011    [address] by Dr. Cordero [facility]) 2020    prostate    DVT (deep venous thrombosis) ([facility] 2008/2011    DVT in right leg during 4/08 around time of bowel surgery;  and again in 2011 after bowel surgery;  coagulopathy workup was OK    Embolism ([facility] 4/1/2008    s/p small bowel surgery, at time of catheter removal reduced EF immediately, improved to normal on subsequent echos    H/O atrioventricular clare ablation 2017    for afib    PAF (paroxysmal atrial fibrillation) ([facility] [facility]     present 15 years, had [address] in 2011 during admission for SBO    Stroke ([facility] 4/5/2008    air embolism after central line removal; s/p small bowel surgery    Ulcerative colitis 2001    s/p total colectomy      Past Surgical History:   Procedure Laterality Date    CARDIOVERSION EXTERNAL  10/17/2011         FLEXIBLE SIGMOIDOSCOPY N/A 1/12/2021    sigmoidoscopy performed by Komal Vallecillo MD at [facility]      HX OTHER SURGICAL      Echo 10/11 - LVEF 55-60%, mild LAE, mild MR    HX SMALL BOWEL RESECTION      HX TOTAL COLECTOMY      total colectomy with IPAA for UC    CO CARDIAC SURG PROCEDURE UNLIST      ablation    CO CARDIOVERSION ELECTIVE ARRHYTHMIA EXTERNAL  4/27/2018         CO COMPRE EP EVAL ABLTJ ATR FIB PULM VEIN ISOLATION  4/6/2018         CO ICAR CATHETER ABLATION ARRHYTHMIA ADD ON  4/6/2018          Social   Social History     Tobacco Use    Smoking status: Never Smoker    Smokeless tobacco: Never Used Substance Use Topics    Alcohol use: Yes     Alcohol/week: 5.0 standard drinks     Types: 1 Glasses of wine, 4 Standard drinks or equivalent per week     Comment: maybe once weekly      Family History   Problem Relation Age of Onset    Arrhythmia Sister     Cancer Sister         breast    Cancer Brother         prostate    Cancer Sister         breast    Cancer Brother         prostate      Allergies   Allergen Reactions    Cephalosporins Other (comments)     Intrahepatic cholestasis with cephalosporin, bilirubin valentina as high as 6. NOT AN ALLERGY, but rather an adverse reaction. He could likely tolerate a short course if the risk < benefit. Prior to Admission Medications   Prescriptions Last Dose Informant Patient Reported? Taking?   aspirin delayed-release 81 mg tablet 2022 at am  Yes Yes   Sig: Take  by mouth daily. cyanocobalamin (VITAMIN B12) 1,000 mcg/mL injection 2022  Yes Yes   Si,000 mcg by IntraMUSCular route every thirty (30) days. flecainide (TAMBOCOR) 100 mg tablet 2022 at pm  No Yes   Sig: TAKE 1 TABLET BY MOUTH TWICE DAILY   multivitamin (ONE A DAY) tablet 2022 at am  Yes Yes   Sig: Take 1 Tab by mouth daily. Facility-Administered Medications: None       PHYSICAL EXAM:  The patient is examined immediately prior to the procedure. Visit Vitals  /75   Pulse (!) 52   Temp 97.6 °F (36.4 °C)   Resp 10   Ht 6' 5\" (1.956 m)   Wt 97.6 kg (215 lb 2.7 oz)   SpO2 99%   BMI 25.52 kg/m²     Gen: Appears comfortable, no distress. Pulm: comfortable respirations with no abnormal audible breath sounds  HEART: well perfused, no abnormal audible heart sounds  GI: abdomen flat. PLAN:  Informed consent discussion held, patient afforded an opportunity to ask questions and all questions answered. After being advised of the risks, benefits, and alternatives, the patient requested that we proceed and indicated so on a written consent form.       Will proceed with procedure as planned.   Shelia Warren MD

## 2022-01-21 NOTE — PROGRESS NOTES
Endoscopy discharge instructions have been reviewed and given to patient. The patient verbalized understanding and acceptance of instructions. Dr. Nisha Garcia discussed with patient procedure findings and next steps.

## 2022-01-21 NOTE — H&P
77 y.o. male for open access colonoscopy for screening   Additional data for completion of the targeted pre-endoscopy H&P will be provided under 'H&P interval notes'. Please see that document which will be attached to this. Alie Portillo MD    Pouch exam (he is s/p colectomy with IPAA \"j pouch\") for ulcerative colitis. EGD given recent diagnosis B12 deficiency - biopsies to look for any dysplasia/atrophic gastritis.   Alie Portillo MD

## 2022-01-21 NOTE — DISCHARGE INSTRUCTIONS
[address] RENAY Valdemar Cordova MD  [phone]      January 21, 2022    Álvaro Joseph  YOB: 1955    COLONOSCOPY DISCHARGE INSTRUCTIONS    If there is redness at IV site you should apply warm compress to area. If redness or soreness persist contact Dr. Valdemar Cordova' or your primary care doctor. There may be a slight amount of blood passed from the rectum. Gaseous discomfort may develop, but walking, belching will help relieve this. You may not operate a vehicle for 12 hours  You may not operate machinery or dangerous appliances for rest of today  You may not drink alcoholic beverages for 12 hours  Avoid making any critical decisions for 24 hours    DIET:  You may resume your normal diet, but some patients find that heavy or large meals may lead to indigestion or vomiting. I suggest a light meal as first food intake. MEDICATIONS:  The use of some over-the-counter pain medication may lead to bleeding after colon biopsies or polyp removal.  Tylenol (also called acetaminophen) is safe to take even if you have had colonoscopy with polyp removal.  Based on the procedure you had today you may not safely take aspirin or aspirin-like products for the next ten (10) days. Remember that Tylenol (also called acetaminophen) is safe to take after colonoscopy even if you have had biopsies or polyps removed. ACTIVITY:  You may resume your normal household activities, but it is recommended that you spend the remainder of the day resting -  avoid any strenuous activity. CALL DR. Adriel Helton' OFFICE IF:  Increasing pain, nausea, vomiting  Abdominal distension (swelling)  Significant new or increased bleeding (oral or rectal)  Fever/Chills  Chest pain/shortness of breath                       Additional instructions:   No aspirin 10 days.   We found that the stomach appears healthy/normal to the visual exam but I took biopsies to ensure it is healthy microscopically. The pouch looks less inflammed/fewer ulcers than last year - I took two small biopsies here as well. I don't think we need to change your current treatment, unless you develop intolerable bowel habits or pain. I'll contact you with biopsy results when available, typically that takes about a week. It was an honor to be your doctor today. Please let me or my office staff know if you have any feedback about today's procedure. Veronica Koroma MD    Colonoscopy saves lives, and can prevent colon cancer. Everyone aged 48 or older needs colonoscopy.   Tell your family and friends: get the test!

## 2022-01-21 NOTE — ANESTHESIA POSTPROCEDURE EVALUATION
Procedure(s):  ESOPHAGOGASTRODUODENOSCOPY (EGD)  POUCHOSCOPY  ESOPHAGOGASTRODUODENAL (EGD) BIOPSY  COLON BIOPSY. MAC    Anesthesia Post Evaluation        Patient location during evaluation: PACU  Patient participation: complete - patient participated  Level of consciousness: sleepy but conscious  Pain management: adequate  Airway patency: patent  Anesthetic complications: no  Cardiovascular status: acceptable and stable  Respiratory status: acceptable and unassisted  Hydration status: acceptable  Comments: The patient was seen and evaluated in the post-operative period. The time of my evaluation may not match the time of this note. The patient denied uncontrolled pain or nausea, and there were no significant complications evident. Wanda Manzo MD      Post anesthesia nausea and vomiting:  none  Final Post Anesthesia Temperature Assessment:  Normothermia (36.0-37.5 degrees C)      INITIAL Post-op Vital signs:   Vitals Value Taken Time   /72 01/21/22 1524   Temp     Pulse 49 01/21/22 1529   Resp 15 01/21/22 1529   SpO2 99 % 01/21/22 1529   Vitals shown include unvalidated device data.

## 2022-01-21 NOTE — PROCEDURES
[address] [facility] RENAY Merchant MD  [phone]      2022    Esophagogastroduodenoscopy & Pouchoscopy Procedure Note  Samantha Davis  : 1955  [facility] Medical Record Number: [id number]      Indications:    B 12 deficiency surveillance for neoplasia in patient with ulcerative colitis, now status post colectomy and ileal-pouch anal anastomosis (J pouch). Referring Physician:  Ani Liang MD  Anesthesia/Sedation: Conscious Sedation/Moderate Sedation/MAC  Endoscopist:  Dr. Natasha Zapata  Complications:  None  Estimated Blood Loss:  None    Permit:  The indications, risks, benefits and alternatives were reviewed with the patient or their decision maker who was provided an opportunity to ask questions and all questions were answered. The specific risks of esophagogastroduodenoscopy with conscious sedation were reviewed, including but not limited to anesthetic complication, bleeding, adverse drug reaction, missed lesion, infection, IV site reactions, and intestinal perforation which would lead to the need for surgical repair. Alternatives to EGD and colonoscopy including radiographic imaging, observation without testing, or laboratory testing were reviewed as well as the limitations of those alternatives discussed. After considering the options and having all their questions answered, the patient or their decision maker provided both verbal and written consent to proceed. -----------EGD------------   Procedure in Detail:  After obtaining informed consent, positioning of the patient in the left lateral decubitus position, and conduction of a pre-procedure pause or \"time out\" the endoscope was introduced into the mouth and advanced to the duodenum. A careful inspection was made, and findings or interventions are described below.     Findings:   Esophagus:normal  Stomach: normal Duodenum/jejunum: normal     Given the question of atrophic gastritis or pernicious anemia, I took cold forceps biopsies from the antrum (pre pyloric greater/lesser, two bites each), incisura/angularis (two bites), and gastric body (greater/lesser). This is the [place] protocol biopsy mapping for gastric intestinal metaplasia. All samples retrieved and hemostasis confirmed from each site.         ----------Pouchoscopy-----------    Procedure in Detail:  After obtaining informed consent, positioning of the patient in the left lateral decubitus position, and conduction of a pre-procedure pause or \"time out\" the endoscope was introduced into the anus and advanced to the ileum. The quality of the colonic preparation was adequate. A careful inspection was made as the colonoscope was withdrawn, findings and interventions are described below. Findings:   The pouch is examined and has some inflammatory change and ischemic-looking ulceration at the apex but on the whole appears less severe than on examination done last year. I took biopsies for histology. The anastomosis between anal canal and the J pouch is free of abnormalities or neoplasia.    ------------------------------  Specimens:    See above    Complications:   None; patient tolerated the procedure well. Impressions:  EGD:  Normal.  Pouchoscopy: Mild pouchitis vs ischemic changes from previous surgery. Recommendations:     - Await pathology. Thank you for entrusting me with this patient's care. Please do not hesitate to contact me with any questions or if I can be of assistance with any of your other patients' GI needs. Signed By: Preeti Rosales MD                        January 21, 2022    Surgical assistant none. Implants none unless specified.

## 2022-01-21 NOTE — PROGRESS NOTES

## 2022-01-21 NOTE — ANESTHESIA PREPROCEDURE EVALUATION
Anesthetic History   No history of anesthetic complications            Review of Systems / Medical History  Patient summary reviewed, nursing notes reviewed and pertinent labs reviewed    Pulmonary              Pertinent negatives: No COPD, asthma and recent URI     Neuro/Psych       CVA  TIA     Cardiovascular            Dysrhythmias : atrial fibrillation      Exercise tolerance: >4 METS  Comments: Ablation 2 years ago   GI/Hepatic/Renal               Comments: Ulcerative colitis Endo/Other             Other Findings   Comments: S/p colonic resection, has J pouch, multiple surgeries since for SBOs             Physical Exam    Airway  Mallampati: II  TM Distance: > 6 cm  Neck ROM: normal range of motion   Mouth opening: Normal     Cardiovascular    Rhythm: regular  Rate: normal         Dental    Dentition: Lower dentition intact and Upper dentition intact     Pulmonary  Breath sounds clear to auscultation               Abdominal         Other Findings            Anesthetic Plan    ASA: 2  Anesthesia type: MAC          Induction: Intravenous  Anesthetic plan and risks discussed with: Patient

## 2022-03-18 PROBLEM — I48.19 PERSISTENT ATRIAL FIBRILLATION (HCC): Status: ACTIVE | Noted: 2018-04-06

## 2022-03-19 PROBLEM — Z98.890 S/P ABLATION OF ATRIAL FIBRILLATION: Status: ACTIVE | Noted: 2018-04-06

## 2022-03-19 PROBLEM — Z86.79 S/P ABLATION OF ATRIAL FIBRILLATION: Status: ACTIVE | Noted: 2018-04-06

## 2022-03-19 PROBLEM — Z98.890 STATUS POST CATHETER ABLATION OF ATRIAL FLUTTER: Status: ACTIVE | Noted: 2018-04-06

## 2022-05-25 RX ORDER — FLECAINIDE ACETATE 100 MG/1
TABLET ORAL
Qty: 180 TABLET | Refills: 1 | Status: SHIPPED | OUTPATIENT
Start: 2022-05-25

## 2022-05-25 NOTE — TELEPHONE ENCOUNTER
Received refill request for flecainide 100 mg po tabs. Last ECG showed narrow QRS. Please order CMP. Refill authorized.     Future Appointments   Date Time Provider Maryam Hines   12/13/2022  8:20 AM MD RUTH Russell AMB

## 2023-01-17 RX ORDER — FLECAINIDE ACETATE 100 MG/1
TABLET ORAL
Qty: 180 TABLET | Refills: 1 | Status: SHIPPED | OUTPATIENT
Start: 2023-01-17

## 2023-01-17 NOTE — TELEPHONE ENCOUNTER
Received refill request for flecainide 100 mg po tabs. Last ECG >3year old, needs updated labs as well. Please schedule for follow up sometime in the next 2-3 months, order CMP to be done at his convenience. Refill request authorized.     Future Appointments   Date Time Provider Maryam Flora   2/3/2023  1:00 PM Steph Alfaro MD [facility] BS AMB

## 2023-03-25 NOTE — PROGRESS NOTES
[facility]  Cardiac Electrophysiology Clinic Care Note                  []Initial visit     [x]Established visit     Patient Name: Matheus Trevino - JGI:[birth year] - QUT:[id number]  Primary Cardiologist: None  Electrophysiologist: Ese Gilliam MD     Reason for visit: Persistent AF/AFL follow up    HPI:  Mr. Roger Avery is a 79 y.o. male who is s/p ablation of persistent AF/typical atrial flutter 04/06/2018. He had recurrent AF/AFL shortly after ablation, required DCCV. Post ablation holter showed PACs, PVCs, & PAT x 5 beats. No AF/AFL noted on flecainide; no AV clare blocker due to tendency for resting bradycardia. He does report some recent skipped beats, states ECG at PCP office at that time showed occasional PVCs. Denies any palpitations or fatigue. Denies chest pain, SOB, PND, orthopnea, lightheadedness, syncope, or edema. ECG today shows sinus bradycardia 55 bpm with LAFB, QRSd 118 ms. LVEF 45-60% per echo in 07/2018 with mild RUEL. [id number] 3 (age, CVA/DVT). No OAC, on ASA 81 mg po daily. CVA was not embolic, was related to air embolism, & DVTs occurred post bowel surgery. Previous:  Candidal esophagitis in 01/2022. Followed by Dr. Shannon Johnston. S/p DCCV 04/28/2018. S/p ablation of PAF/typical AFL 04/06/2018. History of CVA r/t air embolism. Admitted at [facility] for SBO at that time. Central line removed & within 2 minutes had CP/SOB, found to have air embolism. Left sided hemiparesis resolved within a few days. Denied residual neuro deficits. Ca score 0. S/p DCCV 10/17/2011. DVT post bowel surgery in 2008 & 2011. Hypercoagulable eval negative. Ulcerative colitis, s/p total colectomy in 2001. Assessment and Plan     Persistent AF/AFL: S/p ablation 04/06/2018, had recurrence shortly thereafter. Known PAT since then, but denies known recurrent AF/AFL.   Continues flecainide without AV clare blocker due to bradycardia. ECG today shows sinus bradycardia 55 bpm with LAFB, QRSd 118 ms; ok to continue as previously prescribed. Mild RUEL noted via echo in 07/2018, had LVEF 45-60% noted at that time. Will repeat 2D echo. Sinus bradycardia: Asymptomatic. No need for med change or pacemaker. Anticoagulation: [id number] 3 (age, CVA/DVT). DVT occurred post bowel surgery, & CVA wasn't thromboembolic (air embolism). Previous hypercoagulable eval negative. On ASA 81 mg po daily, no OAC. Reviewed risks/benefits of [address]. He prefers to continue on ASA 81 mg po daily for now. Follow up with Dr. Sharif Tran in 1 year. He states he prefers the [facility] office. Future Appointments   Date Time Provider Maryam Hines   4/19/2023  9:00 AM JESSENIA GUERRA BS AMB   4/10/2024  8:40 AM [facility]     ____________________________________________________________    Cardiac testing    Echo (07/12/2018): LVEF 89-35%, grade 1 diastolic dysfunction. Mild RUEL. Trivial MR, AR, TR, & SC.      ECG: Sinus bradycardia 55 bpm, LAFB, QRSd 118 ms.     Review of Systems    [x]All other systems reviewed and all negative except as written in HPI    [] Patient unable to provide secondary to condition         Past Medical History:   Diagnosis Date    Atrial fibrillation with rapid ventricular response ([address] 10/15/2011    [address] by Dr. Vicente Saldivar [facility]) 2020    prostate    DVT (deep venous thrombosis) ([address] 2008/2011    DVT in right leg during 4/08 around time of bowel surgery;  and again in 2011 after bowel surgery;  coagulopathy workup was OK    Embolism ([address] 4/1/2008    s/p small bowel surgery, at time of catheter removal reduced EF immediately, improved to normal on subsequent echos    H/O atrioventricular clare ablation 2017    for afib    PAF (paroxysmal atrial fibrillation) ([facility]     present 15 years, had [address] in 2011 during admission for SBO    Stroke ([facility] 4/5/2008    air embolism after central line removal; s/p small bowel surgery    Ulcerative colitis 2001    s/p total colectomy     Past Surgical History:   Procedure Laterality Date    CARDIOVERSION EXTERNAL  10/17/2011         COLONOSCOPY N/A 1/21/2022    POUCHOSCOPY performed by Diego Burch MD at [facility] N/A 1/12/2021    sigmoidoscopy performed by Diego Burch MD at [facility]      HX OTHER SURGICAL      Echo 10/11 - LVEF 55-60%, mild LAE, mild MR    HX SMALL BOWEL RESECTION      HX TOTAL COLECTOMY      total colectomy with IPAA for UC    [facility] CARDIOVERSION ELECTIVE ARRHYTHMIA EXTERNAL  4/27/2018         [facility] COMPRE EP EVAL ABLTJ ATR FIB PULM VEIN ISOLATION  4/6/2018         [facility] ICAR CATHETER ABLATION ARRHYTHMIA ADD ON  4/6/2018         [facility] UNLISTED PROCEDURE CARDIAC SURGERY      ablation     Social Hx:  reports that he has never smoked. He has never been exposed to tobacco smoke. He has never used smokeless tobacco. He reports current alcohol use of about 11.0 standard drinks per week. He reports that he does not use drugs. Family Hx: family history includes Arrhythmia in his sister; Cancer in his brother, brother, sister, and sister. Allergies   Allergen Reactions    Cephalosporins Other (comments)     Intrahepatic cholestasis with cephalosporin, bilirubin valentina as high as 6. NOT AN ALLERGY, but rather an adverse reaction. He could likely tolerate a short course if the risk < benefit. OBJECTIVE:    Physical Exam    Vitals:   Vitals:    03/31/23 0846   BP: 110/76   Pulse: 63   Resp: 18   SpO2: 98%   Weight: 210 lb (95.3 kg)   Height: 6' 5\" (1.956 m)         General:    Alert, cooperative, no distress, appears stated age. Neck:   Supple, no carotid bruit and no JVD. Back:     Symmetric. Lungs:     Clear to auscultation bilaterally. Heart[de-identified]    Regular rate and rhythm. No murmur, click, rub or gallop. Abdomen:     Soft, non-tender.  Bowel sounds normal.    MSK:   Extremities normal, atraumatic. Moves extremities independently. Vasc/lymph:   No lower extremity edema. Skin:   Skin color normal. No rashes or lesions on visible areas. Neurologic:   Alert, moves all extremities. Data Review:     Radiology:   XR Results (most recent):  Results from Hospital Encounter encounter on 11/01/11    XR KUB    Narrative  **Final Report**      ICD Codes / Adm. Diagnosis: 567.22  789.07 / Abdominal Abscess  ABSCESS OF  ABDOMINAL CAVITY  Examination:  CR ABDOMEN SINGLE VW AP  - [id number] - Nov 5 2011  3:31PM  Accession No:  [id number]  Reason:  SBO      REPORT:  INDICATION: Small bowel obstruction. Two images of the abdomen for a single view study. There are moderate  dilated loops of bowel in the mid and lower right abdomen although less  pronounced than prior examination 10/13/2011. IMPRESSION:  1. Small bowel obstruction suspected. Signing/Reading Doctor: Maged Cali ([id number])  Approved: Maged Cali ([id number])  11/05/2011    CT Results (most recent):  Results from [place] encounter on 03/16/18    CTA CHEST W OR W WO CONT    Narrative  EXAM:  CTA CHEST W OR W WO CONT    INDICATION: Atrial fibrillation, pulmonary vein mapping. COMPARISON: 4/5/2008; CT abdomen pelvis 2/12/2006. TECHNIQUE: Multislice helical CT of the chest was performed after the  administration of 100 mL of Isovue-370. Enhanced, retrospectively EKG gated thin  section images of the heart were acquired. Coronal and sagittal, cross-sectional  maximum intensity projection, and 3-D surface-rendered reformations of the  pulmonary veins and left atrium were generated. CT dose reduction was achieved  through use of a standardized protocol tailored for this examination and  automatic exposure control for dose modulation. FINDINGS:  Pulmonary veins:   An accessory pulmonary vein branch with an aberrant course posterior to the  right pulmonary artery arises from the posterosuperior aspect of the right  superior pulmonary vein origin. It gives branches to the posterior segment of  the right upper lobe, with some extension across the incomplete major fissure  into the superior segment of the right lower lobe. See annotated key images. Pulmonary vein ostial measurements:  Right superior pulmonary vein: 21 x 28 mm. This size is overexaggerated due to a  rather proximal measuring of the ostium, designed to include the accessory  branch. 18 x 19 mm may be a more accurate measurement, though this measurement  is at or slightly distal to the accessory branch. The first branch is the  accessory branch, and it arises at the ostia. Right inferior pulmonary vein: 25 x 25 mm. The first branch arises 8 mm from the  ostium. Left superior pulmonary vein: 26 x 18 mm. It bifurcates 24 mm from the ostium. Left inferior pulmonary vein: 20 x 13 mm. It bifurcates 18 mm from the ostium. Chest: A sub-6 mm right middle lobe nodule requires no follow-up. There is  minimal calcification in the right costophrenic angle. Minimal subpleural  atelectasis. The lungs are otherwise clear. The central airways are patent. No  pneumothorax or pleural effusion. Incidental note is made of an accessory  fissure in the right lower lobe. The heart is enlarged. No thoracic  lymphadenopathy. Small bowel dilation in the epigastric region is partially  imaged. However, this is stable from 2006, where a CT abdomen shows prior total  colectomy and ileoanal pouch anastomosis, accounting for the small bowel  dilation. Subcentimeter hypodense hepatic lesions are too small to characterize,  but likely represent cysts. 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 MRI Results (most recent):  Results from [place] encounter on 11/10/14    MRI ELBOW LT WO CONT    Narrative  **Final Report**      ICD Codes / Adm. Diagnosis: 841.9  789.07 / Sprain and strain of unspecifi  Examination:   ELBOW REINIER MENDOZA LT  - [id number] - Nov 10 2014  9:14AM  Accession No:  [id number]  Reason:  sprain      REPORT:  EXAM:   ELBOW REINIER MENDOZA LT    INDICATION: Left elbow pain    COMPARISON: None    TECHNIQUE: Axial and coronal T1 and T2 fat-sat; sagittal T2 fat-sat and  gradient-echo MRI of the left elbow. CONTRAST: None. FINDINGS: Bone marrow: A small focus of subchondral edema is seen in the  capitellum. Joint fluid:  No significant joint effusion. Biceps, triceps, and brachialis tendons: intact. Common extensor and flexor tendons: Mild/moderate edema surrounds the origin  of the common flexor tendon along the medial aspect of the elbow. Muscles: Within normal limits. Ulnar collateral ligament and radial collateral ligamentous complex: Intact. Ulnar nerve: Within normal limits. Articular cartilage: There is thinning of the cartilage along the capitellum  with a small focus of subchondral edema. This is anterior to the normal bare  area. This is best seen on series 5 image 12 and series 7 image 18. Soft tissue mass: None. Impression  :  1. Mild to moderate medial epicondylitis. 2. Cartilage loss along the capitellum with a small focus of underlying  subchondral edema. Signing/Reading Doctor: Carolee Magdaleno ([id number])  Approved: Carolee Magdaleno ([id number])  Nov 10 2014 11:53AM        No results for input(s): CPK, TROIQ in the last 72 hours. No lab exists for component: CKQMB, CPKMB, BMPP  No results for input(s): NA, K, CL, CO2, BUN, CREA, GLU, PHOS, CA in the last 72 hours. No results for input(s): WBC, HGB, HCT, PLT, HGBEXT, HCTEXT, PLTEXT, HGBEXT, HCTEXT, PLTEXT in the last 72 hours. No results for input(s): PTP, INR, AP, INREXT, INREXT in the last 72 hours. No lab exists for component: PTTP, GPT, SGOT  No results for input(s): CHOL, LDLC in the last 72 hours.     No lab exists for component: TGL, HDLC,  HBA1C  No results for input(s): CRP, TSH, TSHEXT, TSHEXT in the last 72 hours. No lab exists for component: ESR        Current meds:    Current Outpatient Medications:     flecainide (TAMBOCOR) 100 mg tablet, TAKE 1 TABLET BY MOUTH TWICE DAILY, Disp: 180 Tablet, Rfl: 1    cyanocobalamin (VITAMIN B12) 1,000 mcg/mL injection, 1,000 mcg by IntraMUSCular route every thirty (30) days. , Disp: , Rfl:     aspirin delayed-release 81 mg tablet, Take  by mouth daily. , Disp: , Rfl:     multivitamin (ONE A DAY) tablet, Take 1 Tab by mouth daily. , Disp: , Rfl:       Winnie Guevara NP  [facility]  [address] [address]  [facility]  [phone]      CC: Carmen Johns MD

## 2023-03-31 ENCOUNTER — OFFICE VISIT (OUTPATIENT)
Dept: CARDIOLOGY CLINIC | Age: 68
End: 2023-03-31

## 2023-03-31 VITALS
HEART RATE: 63 BPM | HEIGHT: 77 IN | WEIGHT: 210 LBS | RESPIRATION RATE: 18 BRPM | SYSTOLIC BLOOD PRESSURE: 110 MMHG | BODY MASS INDEX: 24.79 KG/M2 | OXYGEN SATURATION: 98 % | DIASTOLIC BLOOD PRESSURE: 76 MMHG

## 2023-03-31 DIAGNOSIS — I48.19 PERSISTENT ATRIAL FIBRILLATION (HCC): Primary | ICD-10-CM

## 2023-03-31 DIAGNOSIS — R00.1 SINUS BRADYCARDIA: ICD-10-CM

## 2023-04-19 ENCOUNTER — ANCILLARY PROCEDURE (OUTPATIENT)
Dept: CARDIOLOGY CLINIC | Age: 68
End: 2023-04-19
Payer: MEDICARE

## 2023-04-19 VITALS
DIASTOLIC BLOOD PRESSURE: 72 MMHG | WEIGHT: 210 LBS | HEIGHT: 77 IN | SYSTOLIC BLOOD PRESSURE: 112 MMHG | BODY MASS INDEX: 24.79 KG/M2

## 2023-04-19 DIAGNOSIS — I48.19 PERSISTENT ATRIAL FIBRILLATION (HCC): ICD-10-CM

## 2023-04-19 PROCEDURE — 93306 TTE W/DOPPLER COMPLETE: CPT | Performed by: INTERNAL MEDICINE

## 2023-04-23 LAB
ECHO AO ASC DIAM: 4 CM
ECHO AO ASCENDING AORTA INDEX: 1.75 CM/M2
ECHO AO ROOT DIAM: 4.1 CM
ECHO AO ROOT INDEX: 1.8 CM/M2
ECHO AV AREA PEAK VELOCITY: 4.2 CM2
ECHO AV AREA VTI: 4.6 CM2
ECHO AV AREA/BSA PEAK VELOCITY: 1.8 CM2/M2
ECHO AV AREA/BSA VTI: 2 CM2/M2
ECHO AV MEAN GRADIENT: 3 MMHG
ECHO AV MEAN VELOCITY: 0.8 M/S
ECHO AV PEAK GRADIENT: 6 MMHG
ECHO AV PEAK VELOCITY: 1.2 M/S
ECHO AV VELOCITY RATIO: 0.75
ECHO AV VTI: 22.4 CM
ECHO EST RA PRESSURE: 3 MMHG
ECHO LA DIAMETER INDEX: 2.06 CM/M2
ECHO LA DIAMETER: 4.7 CM
ECHO LA TO AORTIC ROOT RATIO: 1.15
ECHO LA VOL 2C: 81 ML (ref 18–58)
ECHO LA VOL 4C: 111 ML (ref 18–58)
ECHO LA VOLUME AREA LENGTH: 102 ML
ECHO LA VOLUME INDEX A2C: 36 ML/M2 (ref 16–34)
ECHO LA VOLUME INDEX A4C: 49 ML/M2 (ref 16–34)
ECHO LA VOLUME INDEX AREA LENGTH: 45 ML/M2 (ref 16–34)
ECHO LV E' LATERAL VELOCITY: 11 CM/S
ECHO LV E' SEPTAL VELOCITY: 7 CM/S
ECHO LV EDV A2C: 215 ML
ECHO LV EDV A4C: 212 ML
ECHO LV EDV BP: 217 ML (ref 67–155)
ECHO LV EDV INDEX A4C: 93 ML/M2
ECHO LV EDV INDEX BP: 95 ML/M2
ECHO LV EDV NDEX A2C: 94 ML/M2
ECHO LV EJECTION FRACTION A2C: 60 %
ECHO LV EJECTION FRACTION A4C: 58 %
ECHO LV EJECTION FRACTION BIPLANE: 59 % (ref 55–100)
ECHO LV ESV A2C: 87 ML
ECHO LV ESV A4C: 89 ML
ECHO LV ESV BP: 89 ML (ref 22–58)
ECHO LV ESV INDEX A2C: 38 ML/M2
ECHO LV ESV INDEX A4C: 39 ML/M2
ECHO LV ESV INDEX BP: 39 ML/M2
ECHO LV FRACTIONAL SHORTENING: 28 % (ref 28–44)
ECHO LV INTERNAL DIMENSION DIASTOLE INDEX: 2.5 CM/M2
ECHO LV INTERNAL DIMENSION DIASTOLIC: 5.7 CM (ref 4.2–5.9)
ECHO LV INTERNAL DIMENSION SYSTOLIC INDEX: 1.8 CM/M2
ECHO LV INTERNAL DIMENSION SYSTOLIC: 4.1 CM
ECHO LV IVSD: 1 CM (ref 0.6–1)
ECHO LV MASS 2D: 211.7 G (ref 88–224)
ECHO LV MASS INDEX 2D: 92.9 G/M2 (ref 49–115)
ECHO LV POSTERIOR WALL DIASTOLIC: 0.9 CM (ref 0.6–1)
ECHO LV RELATIVE WALL THICKNESS RATIO: 0.32
ECHO LVOT AREA: 5.3 CM2
ECHO LVOT AV VTI INDEX: 0.87
ECHO LVOT DIAM: 2.6 CM
ECHO LVOT MEAN GRADIENT: 2 MMHG
ECHO LVOT PEAK GRADIENT: 3 MMHG
ECHO LVOT PEAK VELOCITY: 0.9 M/S
ECHO LVOT STROKE VOLUME INDEX: 45.4 ML/M2
ECHO LVOT SV: 103.5 ML
ECHO LVOT VTI: 19.5 CM
ECHO MV A VELOCITY: 0.41 M/S
ECHO MV AREA PHT: 3.4 CM2
ECHO MV AREA VTI: 5.3 CM2
ECHO MV E DECELERATION TIME (DT): 223.6 MS
ECHO MV E VELOCITY: 0.49 M/S
ECHO MV E/A RATIO: 1.2
ECHO MV E/E' LATERAL: 4.45
ECHO MV E/E' RATIO (AVERAGED): 5.73
ECHO MV E/E' SEPTAL: 7
ECHO MV LVOT VTI INDEX: 1
ECHO MV MAX VELOCITY: 0.5 M/S
ECHO MV MEAN GRADIENT: 0 MMHG
ECHO MV MEAN VELOCITY: 0.3 M/S
ECHO MV PEAK GRADIENT: 1 MMHG
ECHO MV PRESSURE HALF TIME (PHT): 64.8 MS
ECHO MV VTI: 19.5 CM
ECHO RA AREA 4C: 26.8 CM2
ECHO RA END SYSTOLIC VOLUME APICAL 4 CHAMBER INDEX BSA: 39 ML/M2
ECHO RA VOLUME: 90 ML
ECHO RIGHT VENTRICULAR SYSTOLIC PRESSURE (RVSP): 25 MMHG
ECHO RV INTERNAL DIMENSION: 4.9 CM
ECHO RV TAPSE: 2.4 CM (ref 1.7–?)
ECHO TV REGURGITANT MAX VELOCITY: 2.32 M/S
ECHO TV REGURGITANT PEAK GRADIENT: 22 MMHG

## 2023-04-23 PROCEDURE — 93306 TTE W/DOPPLER COMPLETE: CPT | Performed by: INTERNAL MEDICINE

## 2023-04-25 ENCOUNTER — TELEPHONE (OUTPATIENT)
Dept: CARDIOLOGY CLINIC | Age: 68
End: 2023-04-25

## 2023-04-25 NOTE — TELEPHONE ENCOUNTER
----- Message from Mallory Foster MD sent at 4/23/2023  6:55 PM EDT -----  No major change on echo  [facility] with meds    Mild biatrial enlargement  Mild MR TR  Mild aorta enlargement

## 2023-04-25 NOTE — TELEPHONE ENCOUNTER
Verified patient with two types of identifiers. Notified of results and MD recommendations. Patient verbalized understanding and will call with any other questions.       Future Appointments   Date Time Provider Maryam Hines   4/10/2024  8:40 AM MD DANIELLE Rivas Ma BS AMB

## 2023-05-08 NOTE — FLOWSHEET NOTE
[address] [place] [address] Dr Lopez Preprocedure Instructions      1. On the day of your surgery, please report to registration located on the 2nd floor of the  [facility]. yes    2. You must have a responsible adult to drive you to the hospital, stay at the hospital during your procedure and drive you home. If they leave your procedure will not be started (no exceptions). yes    3. Do not have anything to eat or drink (including water, gum, mints, coffee, and juice) after midnight. This does not apply to the medications you were instructed to take by your physician. yes  If you are currently taking Plavix, Coumadin, Aspirin, or other blood-thinning agents, contact your physician for special instructions. yes,aspirin    4. If you are having a procedure that requires bowel prep: We recommend that you have only clear liquids the day before your procedure and begin your bowel prep by 5:00 pm.  You may continue to drink clear liquids until midnight. If for any reason you are not able to complete your prep please notify your physician immediately. yes    5. Have a list of all current medications, including vitamins, herbal supplements and any other over the counter medications. Reviewed over the phone    6. If you wear glasses, contacts, dentures and/or hearing aids, they may be removed prior to procedure, please bring a case to store them in. yes    7. You should understand that if you do not follow these instructions your procedure may be cancelled. If your physical condition changes (I.e. fever, cold or flu) please contact your doctor as soon as possible. 8. It is important that you be on time. If for any reason you are unable to keep your appointment please call [phone] the day of or your physicians office prior to your scheduled procedure    9.  Have you received your COVID Vaccine? yes If no, you will need to receive a COVID test/swab here at [facility] the MOB parking lot Monday - Friday 8a -

## 2023-05-09 ENCOUNTER — HOSPITAL ENCOUNTER (OUTPATIENT)
Facility: HOSPITAL | Age: 68
Setting detail: OUTPATIENT SURGERY
Discharge: HOME | End: 2023-05-09
Attending: SPECIALIST | Admitting: SPECIALIST
Payer: MEDICARE

## 2023-05-09 ENCOUNTER — ANESTHESIA (OUTPATIENT)
Facility: HOSPITAL | Age: 68
End: 2023-05-09
Payer: MEDICARE

## 2023-05-09 ENCOUNTER — ANESTHESIA EVENT (OUTPATIENT)
Facility: HOSPITAL | Age: 68
End: 2023-05-09
Payer: MEDICARE

## 2023-05-09 VITALS
HEART RATE: 55 BPM | RESPIRATION RATE: 22 BRPM | SYSTOLIC BLOOD PRESSURE: 106 MMHG | BODY MASS INDEX: 24.03 KG/M2 | OXYGEN SATURATION: 99 % | DIASTOLIC BLOOD PRESSURE: 71 MMHG | WEIGHT: 203.48 LBS | HEIGHT: 77 IN | TEMPERATURE: 97.6 F

## 2023-05-09 PROCEDURE — 7100000011 HC PHASE II RECOVERY - ADDTL 15 MIN: Performed by: SPECIALIST

## 2023-05-09 PROCEDURE — 2709999900 HC NON-CHARGEABLE SUPPLY: Performed by: SPECIALIST

## 2023-05-09 PROCEDURE — 7100000010 HC PHASE II RECOVERY - FIRST 15 MIN: Performed by: SPECIALIST

## 2023-05-09 PROCEDURE — 3700000000 HC ANESTHESIA ATTENDED CARE: Performed by: SPECIALIST

## 2023-05-09 PROCEDURE — 3600007512: Performed by: SPECIALIST

## 2023-05-09 PROCEDURE — 88305 TISSUE EXAM BY PATHOLOGIST: CPT

## 2023-05-09 PROCEDURE — 3700000001 HC ADD 15 MINUTES (ANESTHESIA): Performed by: SPECIALIST

## 2023-05-09 PROCEDURE — 2580000003 HC RX 258: Performed by: NURSE ANESTHETIST, CERTIFIED REGISTERED

## 2023-05-09 PROCEDURE — 6360000002 HC RX W HCPCS: Performed by: NURSE ANESTHETIST, CERTIFIED REGISTERED

## 2023-05-09 PROCEDURE — 3600007502: Performed by: SPECIALIST

## 2023-05-09 RX ORDER — SODIUM CHLORIDE 0.9 % (FLUSH) 0.9 %
5-40 SYRINGE (ML) INJECTION PRN
Status: DISCONTINUED | OUTPATIENT
Start: 2023-05-09 | End: 2023-05-09 | Stop reason: HOSPADM

## 2023-05-09 RX ORDER — PROPOFOL 10 MG/ML
INJECTION, EMULSION INTRAVENOUS PRN
Status: DISCONTINUED | OUTPATIENT
Start: 2023-05-09 | End: 2023-05-09 | Stop reason: SDUPTHER

## 2023-05-09 RX ORDER — 0.9 % SODIUM CHLORIDE 0.9 %
INTRAVENOUS SOLUTION INTRAVENOUS PRN
Status: DISCONTINUED | OUTPATIENT
Start: 2023-05-09 | End: 2023-05-09 | Stop reason: SDUPTHER

## 2023-05-09 RX ADMIN — SODIUM CHLORIDE 200 ML: 9 INJECTION, SOLUTION INTRAVENOUS at 11:47

## 2023-05-09 RX ADMIN — PROPOFOL 100 MG: 10 INJECTION, EMULSION INTRAVENOUS at 11:37

## 2023-05-09 ASSESSMENT — PAIN - FUNCTIONAL ASSESSMENT: PAIN_FUNCTIONAL_ASSESSMENT: NONE - DENIES PAIN

## 2023-05-09 NOTE — H&P
79 y.o. male for open access colonoscopy for screening   Additional data for completion of the targeted pre-endoscopy H&P will be provided under 'H&P interval notes'. Please see that document which will be attached to this.   Maycol Bowie MD  For pouchoscopy
COLECTOMY      total colectomy with IPAA for UC    TOTAL KNEE ARTHROPLASTY Right      Social   Social History     Tobacco Use    Smoking status: Never    Smokeless tobacco: Never   Substance Use Topics    Alcohol use: Yes     Alcohol/week: 11.0 standard drinks     Comment: social      Family History   Problem Relation Age of Onset    Cancer Sister         breast    Arrhythmia Sister         a fib    Cancer Sister         breast    Cancer Brother         prostate    Cancer Brother         prostate      Allergies   Allergen Reactions    Cephalosporins Other (See Comments)     Intrahepatic cholestasis with cephalosporin, bilirubin alyssa as high as 6. NOT AN ALLERGY, but rather an adverse reaction. He could likely tolerate a short course if the risk < benefit. Prior to Admission Medications   Prescriptions Last Dose Informant Patient Reported? Taking? FLECAINIDE ACETATE PO 5/8/2023  Yes Yes   Sig: Take by mouth 2 times daily Will bring in strength. Multiple Vitamin (MULTIVITAMIN PO) 5/8/2023  Yes Yes   Sig: Take by mouth Takes one po once daily. aspirin 81 MG EC tablet 5/7/2023  Yes No   Sig: Take 1 tablet by mouth daily   cyanocobalamin 1000 MCG/ML injection 5/7/2023  Yes No   Sig: Inject into the muscle every 30 days      Facility-Administered Medications: None       PHYSICAL EXAM:  The patient is examined immediately prior to the procedure. Vitals:    05/09/23 1040   BP: (!) 144/76   Pulse: 55   Resp: 14   Temp: 97.6 °F (36.4 °C)   SpO2: 98%     Gen: Appears comfortable, no distress. Pulm: comfortable respirations with no abnormal audible breath sounds  HEART: well perfused, no abnormal audible heart sounds  GI: abdomen flat. PLAN:  Informed consent discussion held, patient afforded an opportunity to ask questions and all questions answered. After being advised of the risks, benefits, and alternatives, the patient requested that we proceed and indicated so on a written consent form.       Will

## 2023-05-09 NOTE — PROGRESS NOTES
Clovis Melton  1955  [id number]    Situation:  Verbal report received from: CALVIN Ott   Procedure: Procedure(s):  COLONOSCOPY  COLONOSCOPY WITH BIOPSY    Background:    Preoperative diagnosis: Ulcerative colitis with complication, unspecified location [facility]) [K51.919]  Postoperative diagnosis: * No post-op diagnosis entered *    :  Dr. Lorie Adame   Assistant(s): Endoscopy Technician: Abraham Yañez  ENDOSCOPY NURSE: Kaylene Diallo RN    Specimens:   ID Type Source Tests Collected by Time Destination   A : J Pouch Biopsies Tissue Colon SURGICAL PATHOLOGY Kamala Medel MD 5/9/2023 1144      H. Pylori  no    Assessment:  Intra-procedure medications     Anesthesia gave intra-procedure sedation and medications, see anesthesia flow sheet yes    Intravenous fluids: NS@ KVO     Vital signs stable   yes    Abdominal assessment: round and soft   yes    Recommendation:  Discharge patient per MD order  yes.   Return to floor  outpatient  Family or Friend   spouse  Permission to share finding with family or friend yes

## 2023-05-09 NOTE — DISCHARGE INSTRUCTIONS
[address] [facility] MALLIKA Guardado MD  [phone]      May 9, 2023    Tanay Barros  YOB: 1955    COLONOSCOPY DISCHARGE INSTRUCTIONS    If there is redness at IV site you should apply warm compress to area. If redness or soreness persist contact Dr. Neri Guardado' or your primary care doctor. There may be a slight amount of blood passed from the rectum. Gaseous discomfort may develop, but walking, belching will help relieve this. You may not operate a vehicle for 12 hours  You may not operate machinery or dangerous appliances for rest of today  You may not drink alcoholic beverages for 12 hours  Avoid making any critical decisions for 24 hours    DIET:  You may resume your normal diet, but some patients find that heavy or large meals may lead to indigestion or vomiting. I suggest a light meal as first food intake. MEDICATIONS:  The use of some over-the-counter pain medication may lead to bleeding after colon biopsies or polyp removal.  Tylenol (also called acetaminophen) is safe to take even if you have had colonoscopy with polyp removal.  Based on the procedure you had today you may not safely take aspirin or aspirin-like products for the next ten (10) days. Remember that Tylenol (also called acetaminophen) is safe to take after colonoscopy even if you have had biopsies or polyps removed. ACTIVITY:  You may resume your normal household activities, but it is recommended that you spend the remainder of the day resting -  avoid any strenuous activity. CALL DR. Shahla Fajardo' OFFICE IF:  Increasing pain, nausea, vomiting  Abdominal distension (swelling)  Significant new or increased bleeding (oral or rectal)  Fever/Chills  Chest pain/shortness of breath                       Additional instructions:   No aspirin 10 days.   We found that there was significantly more inflammation on today's exam than seen on last.  My

## 2023-05-09 NOTE — ANESTHESIA PRE PROCEDURE
BP Readings from Last 3 Encounters:   05/09/23 (!) 144/76   04/19/23 112/72   03/31/23 110/76       NPO Status:                                                                                 BMI:   Wt Readings from Last 3 Encounters:   05/09/23 203 lb 7.8 oz (92.3 kg)   04/19/23 210 lb (95.3 kg)   03/31/23 210 lb (95.3 kg)     Body mass index is 24.13 kg/m². CBC: No results found for: WBC, RBC, HGB, HCT, MCV, RDW, PLT    CMP: No results found for: NA, K, CL, CO2, BUN, CREATININE, GFRAA, AGRATIO, LABGLOM, GLUCOSE, GLU, PROT, CALCIUM, BILITOT, ALKPHOS, AST, ALT    POC Tests: No results for input(s): POCGLU, POCNA, POCK, POCCL, POCBUN, POCHEMO, POCHCT in the last 72 hours. Coags: No results found for: PROTIME, INR, APTT    HCG (If Applicable): No results found for: PREGTESTUR, PREGSERUM, HCG, HCGQUANT     ABGs: No results found for: PHART, PO2ART, GCV4CFD, JXC4QPL, BEART, T2RMURFH     Type & Screen (If Applicable):  No results found for: LABABO, LABRH    Drug/Infectious Status (If Applicable):  No results found for: HIV, HEPCAB    COVID-19 Screening (If Applicable):   Lab Results   Component Value Date/Time    COVID19 Not detected 01/18/2022 08:33 AM           Anesthesia Evaluation  Patient summary reviewed and Nursing notes reviewed  Airway: Mallampati: I  TM distance: >3 FB   Neck ROM: full  Mouth opening: > = 3 FB   Dental: normal exam         Pulmonary:Negative Pulmonary ROS breath sounds clear to auscultation                             Cardiovascular:  Exercise tolerance: good (>4 METS),   (+) dysrhythmias (on flecanide ): atrial fibrillation,         Rhythm: regular  Rate: normal                    Neuro/Psych:   Negative Neuro/Psych ROS  (+) CVA:,             GI/Hepatic/Renal: Neg GI/Hepatic/Renal ROS            Endo/Other: Negative Endo/Other ROS   (+) malignancy/cancer (prostate cancer ). Abdominal:             Vascular: negative vascular ROS.          Other Findings:

## 2023-05-09 NOTE — PERIOP NOTE
Initial RN admission and assessment performed and documented in Endoscopy navigator. Patient evaluated by anesthesia in pre-procedure holding. All procedural vital signs, airway assessment, and level of consciousness information monitored and recorded by anesthesia staff on the anesthesia record. Report received from Blanca Newman Se, post procedure. Patient transported to recovery area by RN. Report given to post procedure RNPatricia was pre-cleaned at bedside immediately following procedure by Mariane Mohs.

## 2023-05-09 NOTE — PROGRESS NOTES
Endoscopy discharge instructions have been reviewed and given to patient. The patient verbalized understanding and acceptance of instructions. Dr. Radha Pereyra  discussed with spouse procedure findings and next steps.

## 2023-05-09 NOTE — ANESTHESIA POSTPROCEDURE EVALUATION
Department of Anesthesiology  Postprocedure Note    Patient: Jeffery King  MRN: [id number]  YOB: 1955  Date of evaluation: 5/9/2023      Procedure Summary     Date: 05/09/23 Room / Location: [facility] ENDO 03 / [facility] ENDOSCOPY    Anesthesia Start: 1135 Anesthesia Stop: 1149    Procedures:       COLONOSCOPY (Lower GI Region)      COLONOSCOPY WITH BIOPSY (Lower GI Region) Diagnosis:       [facility]cer[facility]      (Ulcerative colitis with complication, unspecified location [facility]) [K51.919])    Surgeons: Lyn Toribio MD Responsible Provider: Hao Gonzalez MD    Anesthesia Type: MAC ASA Status: 2          Anesthesia Type: No value filed.     Camelia Phase I: Camelia Score: 10    Camelia Phase II: Camelia Score: 10      Anesthesia Post Evaluation    Patient location during evaluation: PACU  Patient participation: complete - patient participated  Level of consciousness: awake  Airway patency: patent  Complications: no  Cardiovascular status: hemodynamically stable  Respiratory status: acceptable  Hydration status: stable

## 2023-05-09 NOTE — OP NOTE
[address] [facility] MALLIKA Yeung MD  [phone]      May 9, 2023    Pouchoscopy Procedure Note  Jeffery King  :  1955  [facility] Medical Record Number: [id number]    Indications:    History of ulcerative colitis status post colectomy and IPAA. For reexam.   PCP:  John Pate MD  Anesthesia/Sedation: Conscious Sedation/Moderate Sedation/GETA, see notes  Endoscopist:  Dr. Dianna Yoo  Complications:  None  Estimated Blood Loss:  None    Permit:  The indications, risks, benefits and alternatives were reviewed with the patient or their decision maker who was provided an opportunity to ask questions and all questions were answered. The specific risks of colonoscopy with conscious sedation were reviewed, including but not limited to anesthetic complication, bleeding, adverse drug reaction, missed lesion, infection, IV site reactions, and intestinal perforation which would lead to the need for surgical repair. Alternatives to colonoscopy including radiographic imaging, observation without testing, or laboratory testing were reviewed including the limitations of those alternatives. After considering the options and having all their questions answered, the patient or their decision maker provided both verbal and written consent to proceed. Procedure in Detail:  After obtaining informed consent, positioning of the patient in the left lateral decubitus position, and conduction of a pre-procedure pause or \"time out\" the endoscope was introduced into the anus and advanced to the ileum. The quality of the small intestinal preparation was adequate. A careful inspection was made as the colonoscope was withdrawn, findings and interventions are described below.     Findings:   Inflammatory changes of his J pouch are greater than that seen/reported on last exam.  There are several focal areas of shallow mucosal

## 2023-06-27 ENCOUNTER — ANESTHESIA EVENT (OUTPATIENT)
Facility: HOSPITAL | Age: 68
End: 2023-06-27
Payer: MEDICARE

## 2023-06-27 ENCOUNTER — HOSPITAL ENCOUNTER (OUTPATIENT)
Facility: HOSPITAL | Age: 68
Setting detail: OUTPATIENT SURGERY
Discharge: HOME | End: 2023-06-27
Attending: SPECIALIST | Admitting: SPECIALIST
Payer: MEDICARE

## 2023-06-27 ENCOUNTER — ANESTHESIA (OUTPATIENT)
Facility: HOSPITAL | Age: 68
End: 2023-06-27
Payer: MEDICARE

## 2023-06-27 VITALS
BODY MASS INDEX: 23.62 KG/M2 | DIASTOLIC BLOOD PRESSURE: 72 MMHG | HEART RATE: 52 BPM | SYSTOLIC BLOOD PRESSURE: 116 MMHG | HEIGHT: 78 IN | OXYGEN SATURATION: 99 % | TEMPERATURE: 98.1 F | WEIGHT: 204.15 LBS | RESPIRATION RATE: 20 BRPM

## 2023-06-27 PROCEDURE — 3700000000 HC ANESTHESIA ATTENDED CARE: Performed by: SPECIALIST

## 2023-06-27 PROCEDURE — 7100000011 HC PHASE II RECOVERY - ADDTL 15 MIN: Performed by: SPECIALIST

## 2023-06-27 PROCEDURE — 2709999900 HC NON-CHARGEABLE SUPPLY: Performed by: SPECIALIST

## 2023-06-27 PROCEDURE — 3600007502: Performed by: SPECIALIST

## 2023-06-27 PROCEDURE — 7100000010 HC PHASE II RECOVERY - FIRST 15 MIN: Performed by: SPECIALIST

## 2023-06-27 PROCEDURE — 6360000002 HC RX W HCPCS: Performed by: NURSE ANESTHETIST, CERTIFIED REGISTERED

## 2023-06-27 PROCEDURE — 88305 TISSUE EXAM BY PATHOLOGIST: CPT

## 2023-06-27 RX ORDER — SODIUM CHLORIDE 0.9 % (FLUSH) 0.9 %
5-40 SYRINGE (ML) INJECTION PRN
Status: DISCONTINUED | OUTPATIENT
Start: 2023-06-27 | End: 2023-06-27 | Stop reason: HOSPADM

## 2023-06-27 RX ORDER — PROPOFOL 10 MG/ML
INJECTION, EMULSION INTRAVENOUS PRN
Status: DISCONTINUED | OUTPATIENT
Start: 2023-06-27 | End: 2023-06-27 | Stop reason: SDUPTHER

## 2023-06-27 RX ORDER — SODIUM CHLORIDE 9 MG/ML
INJECTION, SOLUTION INTRAVENOUS CONTINUOUS
Status: DISCONTINUED | OUTPATIENT
Start: 2023-06-27 | End: 2023-06-27 | Stop reason: HOSPADM

## 2023-06-27 RX ORDER — LIDOCAINE HYDROCHLORIDE 20 MG/ML
INJECTION, SOLUTION INTRAVENOUS PRN
Status: DISCONTINUED | OUTPATIENT
Start: 2023-06-27 | End: 2023-06-27 | Stop reason: SDUPTHER

## 2023-06-27 RX ADMIN — PROPOFOL 50 MG: 10 INJECTION, EMULSION INTRAVENOUS at 12:04

## 2023-06-27 RX ADMIN — LIDOCAINE HYDROCHLORIDE 25 MG: 20 INJECTION, SOLUTION INTRAVENOUS at 11:59

## 2023-06-27 RX ADMIN — PROPOFOL 50 MG: 10 INJECTION, EMULSION INTRAVENOUS at 12:00

## 2023-06-27 RX ADMIN — PROPOFOL 100 MG: 10 INJECTION, EMULSION INTRAVENOUS at 11:59

## 2023-06-27 ASSESSMENT — PAIN - FUNCTIONAL ASSESSMENT: PAIN_FUNCTIONAL_ASSESSMENT: NONE - DENIES PAIN

## 2023-10-13 ENCOUNTER — CLINICAL DOCUMENTATION (OUTPATIENT)
Age: 68
End: 2023-10-13

## 2023-10-13 DIAGNOSIS — I48.19 OTHER PERSISTENT ATRIAL FIBRILLATION (HCC): Primary | ICD-10-CM

## 2023-10-13 DIAGNOSIS — I48.92 UNSPECIFIED ATRIAL FLUTTER (HCC): ICD-10-CM

## 2023-10-13 DIAGNOSIS — Z86.79 PERSONAL HISTORY OF OTHER DISEASES OF THE CIRCULATORY SYSTEM: ICD-10-CM

## 2023-10-13 DIAGNOSIS — I34.0 NONRHEUMATIC MITRAL (VALVE) INSUFFICIENCY: ICD-10-CM

## 2023-10-13 RX ORDER — FLECAINIDE ACETATE 100 MG/1
100 TABLET ORAL 2 TIMES DAILY
Qty: 60 TABLET | Refills: 5 | Status: SHIPPED | OUTPATIENT
Start: 2023-10-13

## 2023-10-13 NOTE — PROGRESS NOTES
Received labs dated 02/22/2023.     Na 141  K 4.1  Glu 93  BUN 11  Cr 1.04    WBC 6.3  Hgb 13.6  Hct 40.6  Plt 240

## 2023-10-13 NOTE — TELEPHONE ENCOUNTER
Called patient to inform him that we need updated lab work for his flecainide refill. Patient did not answer; left voicemail with request for return call.

## 2023-10-13 NOTE — TELEPHONE ENCOUNTER
Request for flecainide 100 mg. Last office visit 3/31/23, next office visit 4/10/24. Refills per verbal order from Jonnathan Bartlett NP. Verified patient with two types of identifiers. Informed patient that updated lab work was needed due to his flecainide use. CMP ordered; patient will have done next week at [facility]. Address provided.

## 2023-10-17 ENCOUNTER — TELEPHONE (OUTPATIENT)
Age: 68
End: 2023-10-17

## 2023-10-17 NOTE — TELEPHONE ENCOUNTER
----- Message from GAVIN Pappas NP sent at 10/17/2023  7:40 AM EDT -----  Labs without significant acute abnormalities. No change in treatment plan based on results.

## 2024-04-10 ENCOUNTER — OFFICE VISIT (OUTPATIENT)
Age: 69
End: 2024-04-10
Payer: MEDICARE

## 2024-04-10 VITALS
HEART RATE: 60 BPM | WEIGHT: 207.8 LBS | BODY MASS INDEX: 24.04 KG/M2 | OXYGEN SATURATION: 98 % | RESPIRATION RATE: 14 BRPM | SYSTOLIC BLOOD PRESSURE: 112 MMHG | DIASTOLIC BLOOD PRESSURE: 70 MMHG | HEIGHT: 78 IN

## 2024-04-10 DIAGNOSIS — Z51.81 ENCOUNTER FOR MONITORING FLECAINIDE THERAPY: ICD-10-CM

## 2024-04-10 DIAGNOSIS — I48.92 PAROXYSMAL ATRIAL FLUTTER (HCC): Primary | ICD-10-CM

## 2024-04-10 DIAGNOSIS — I48.19 PERSISTENT ATRIAL FIBRILLATION (HCC): ICD-10-CM

## 2024-04-10 DIAGNOSIS — Z79.899 ENCOUNTER FOR MONITORING FLECAINIDE THERAPY: ICD-10-CM

## 2024-04-10 PROCEDURE — 1036F TOBACCO NON-USER: CPT | Performed by: INTERNAL MEDICINE

## 2024-04-10 PROCEDURE — 99214 OFFICE O/P EST MOD 30 MIN: CPT | Performed by: INTERNAL MEDICINE

## 2024-04-10 PROCEDURE — 93010 ELECTROCARDIOGRAM REPORT: CPT | Performed by: INTERNAL MEDICINE

## 2024-04-10 PROCEDURE — G8427 DOCREV CUR MEDS BY ELIG CLIN: HCPCS | Performed by: INTERNAL MEDICINE

## 2024-04-10 PROCEDURE — G8420 CALC BMI NORM PARAMETERS: HCPCS | Performed by: INTERNAL MEDICINE

## 2024-04-10 PROCEDURE — 3017F COLORECTAL CA SCREEN DOC REV: CPT | Performed by: INTERNAL MEDICINE

## 2024-04-10 PROCEDURE — 93005 ELECTROCARDIOGRAM TRACING: CPT | Performed by: INTERNAL MEDICINE

## 2024-04-10 PROCEDURE — 1123F ACP DISCUSS/DSCN MKR DOCD: CPT | Performed by: INTERNAL MEDICINE

## 2024-04-10 NOTE — PROGRESS NOTES
Room #: EP 4    Chief Complaint   Patient presents with    Atrial Fibrillation    Bradycardia     /70 (Site: Left Upper Arm, Position: Sitting, Cuff Size: Medium Adult)   Pulse 60   Resp 14   Ht 1.981 m (6' 6\")   Wt 94.3 kg (207 lb 12.8 oz)   SpO2 98%   BMI 24.01 kg/m²         Chest pain: NO       1. Have you been to the ER, urgent care clinic outside [facility] since your last visit?  Hospitalized since your last visit?  NO        Refills:  NO

## 2024-10-23 ENCOUNTER — ANESTHESIA (OUTPATIENT)
Facility: HOSPITAL | Age: 69
End: 2024-10-23
Payer: MEDICARE

## 2024-10-23 ENCOUNTER — HOSPITAL ENCOUNTER (OUTPATIENT)
Facility: HOSPITAL | Age: 69
Setting detail: OUTPATIENT SURGERY
Discharge: HOME | End: 2024-10-23
Attending: SPECIALIST | Admitting: SPECIALIST
Payer: MEDICARE

## 2024-10-23 ENCOUNTER — ANESTHESIA EVENT (OUTPATIENT)
Facility: HOSPITAL | Age: 69
End: 2024-10-23
Payer: MEDICARE

## 2024-10-23 VITALS
TEMPERATURE: 98.4 F | HEIGHT: 77 IN | SYSTOLIC BLOOD PRESSURE: 116 MMHG | HEART RATE: 61 BPM | WEIGHT: 205.03 LBS | RESPIRATION RATE: 20 BRPM | OXYGEN SATURATION: 96 % | DIASTOLIC BLOOD PRESSURE: 72 MMHG | BODY MASS INDEX: 24.21 KG/M2

## 2024-10-23 PROCEDURE — 3700000000 HC ANESTHESIA ATTENDED CARE: Performed by: SPECIALIST

## 2024-10-23 PROCEDURE — 3600007502: Performed by: SPECIALIST

## 2024-10-23 PROCEDURE — 2709999900 HC NON-CHARGEABLE SUPPLY: Performed by: SPECIALIST

## 2024-10-23 PROCEDURE — 6360000002 HC RX W HCPCS: Performed by: NURSE ANESTHETIST, CERTIFIED REGISTERED

## 2024-10-23 PROCEDURE — 7100000010 HC PHASE II RECOVERY - FIRST 15 MIN: Performed by: SPECIALIST

## 2024-10-23 PROCEDURE — 7100000011 HC PHASE II RECOVERY - ADDTL 15 MIN: Performed by: SPECIALIST

## 2024-10-23 RX ORDER — PROPOFOL 10 MG/ML
INJECTION, EMULSION INTRAVENOUS
Status: DISCONTINUED | OUTPATIENT
Start: 2024-10-23 | End: 2024-10-23 | Stop reason: SDUPTHER

## 2024-10-23 RX ORDER — SODIUM CHLORIDE 9 MG/ML
INJECTION, SOLUTION INTRAVENOUS CONTINUOUS
Status: DISCONTINUED | OUTPATIENT
Start: 2024-10-23 | End: 2024-10-23 | Stop reason: HOSPADM

## 2024-10-23 RX ORDER — ATORVASTATIN CALCIUM 10 MG/1
10 TABLET, FILM COATED ORAL DAILY
COMMUNITY
Start: 2024-06-27

## 2024-10-23 RX ORDER — SIMETHICONE 40MG/0.6ML
40 SUSPENSION, DROPS(FINAL DOSAGE FORM)(ML) ORAL AS NEEDED
Status: DISCONTINUED | OUTPATIENT
Start: 2024-10-23 | End: 2024-10-23 | Stop reason: HOSPADM

## 2024-10-23 RX ORDER — SODIUM CHLORIDE 0.9 % (FLUSH) 0.9 %
5-40 SYRINGE (ML) INJECTION PRN
Status: DISCONTINUED | OUTPATIENT
Start: 2024-10-23 | End: 2024-10-23 | Stop reason: HOSPADM

## 2024-10-23 RX ORDER — LIDOCAINE HCL/PF 100 MG/5ML
SYRINGE (ML) INJECTION
Status: DISCONTINUED | OUTPATIENT
Start: 2024-10-23 | End: 2024-10-23 | Stop reason: SDUPTHER

## 2024-10-23 RX ADMIN — PROPOFOL 70 MG: 10 INJECTION, EMULSION INTRAVENOUS at 12:23

## 2024-10-23 RX ADMIN — PROPOFOL 30 MG: 10 INJECTION, EMULSION INTRAVENOUS at 12:26

## 2024-10-23 RX ADMIN — LIDOCAINE HYDROCHLORIDE 60 MG: 20 INJECTION INTRAVENOUS at 12:23

## 2024-10-23 ASSESSMENT — PAIN - FUNCTIONAL ASSESSMENT: PAIN_FUNCTIONAL_ASSESSMENT: 0-10

## 2024-10-23 NOTE — ANESTHESIA PRE PROCEDURE
Department of Anesthesiology  Preprocedure Note       Name:  Sergio De La Cruz   Age:  68 y.o.  :  1955                                          MRN:  [id number]         Date:  10/23/2024      Surgeon: Surgeon(s):  Erik Delgado MD    Procedure: Procedure(s):  POUCHOSCOPY    Medications prior to admission:   Prior to Admission medications    Medication Sig Start Date End Date Taking? Authorizing Provider   atorvastatin (LIPITOR) 10 MG tablet Take 1 tablet by mouth daily 24  Yes ProviderSandi MD   Cholecalciferol (VITAMIN D-3 PO) Take 4,000 Units by mouth daily   Yes ProviderSandi MD   flecainide (TAMBOCOR) 100 MG tablet TAKE 1 TABLET BY MOUTH TWICE DAILY 10/13/23  Yes Malika Rosenbaum, APRN - NP   cyanocobalamin 1000 MCG/ML injection Inject into the muscle every 30 days    Automatic Reconciliation, Ar       Current medications:    Current Facility-Administered Medications   Medication Dose Route Frequency Provider Last Rate Last Admin    0.9 % sodium chloride infusion   IntraVENous Continuous Erik Delgado MD        sodium chloride flush 0.9 % injection 5-40 mL  5-40 mL IntraVENous PRN Erik Delgado MD        simethicone (MYLICON) 40 MG/0.6ML suspension drops 40 mg  40 mg Oral PRN Erik Delgado MD           Allergies:    Allergies   Allergen Reactions    Cephalosporins Other (See Comments)     Intrahepatic cholestasis with cephalosporin, bilirubin alyssa as high as 6.  NOT AN ALLERGY, but rather an adverse reaction.  He could likely tolerate a short course if the risk < benefit.       Problem List:    Patient Active Problem List   Diagnosis Code    Palpitations R00.2    Persistent atrial fibrillation (HCC) I48.19    S/P ablation of atrial fibrillation Z98.890, Z86.79    Status post catheter ablation of atrial flutter Z98.890    Atrial fibrillation with rapid ventricular response (HCC) I48.91    Paroxysmal atrial flutter (HCC) I48.92       Past Medical

## 2024-10-23 NOTE — ANESTHESIA POSTPROCEDURE EVALUATION
Department of Anesthesiology  Postprocedure Note    Patient: Sergio De La Cruz  MRN: [id number]  YOB: 1955  Date of evaluation: 10/23/2024    Procedure Summary       Date: 10/23/24 Room / Location: [place] [address] / [facility] ENDOSCOPY    Anesthesia Start: 1218 Anesthesia Stop: 1228    Procedure: POUCHOSCOPY Diagnosis:       Pouchitis (HCC)      (Pouchitis (HCC) [K91.850])    Surgeons: Erik Delgado MD Responsible Provider: Jerry Bryant MD    Anesthesia Type: MAC ASA Status: 3            Anesthesia Type: No value filed.    Camelia Phase I: Camelia Score: 10    Camelia Phase II: Camelia Score: 10    Anesthesia Post Evaluation    Patient location during evaluation: PACU  Patient participation: complete - patient participated  Level of consciousness: awake  Airway patency: patent  Nausea & Vomiting: no vomiting and no nausea  Cardiovascular status: hemodynamically stable  Respiratory status: acceptable  Hydration status: stable  Pain management: adequate    No notable events documented.

## 2024-10-23 NOTE — H&P
Pre-Endoscopy H&P Update  Chief complaint/HPI/ROS:  The indication for the procedure, the patient's history and the patient's current medications are reviewed prior to the procedure and that data is reported on the H&P to which this document is attached.  Any significant complaints with regard to organ systems will be noted, and if not mentioned then a review of systems is not contributory.  Past Medical History:   Diagnosis Date    Atrial fibrillation with rapid ventricular response (HCC) 10/15/2011    DCC by Dr. Novoa 2011    Cancer ([facility]) 2020    prostate    DVT (deep venous thrombosis) ([facility]) 2008/2011    DVT in right leg during 4/08 around time of bowel surgery;  and again in 2011 after bowel surgery;  coagulopathy workup was OK    Embolism ([facility]) 4/1/2008    s/p small bowel surgery, at time of catheter removal reduced EF immediately, improved to normal on subsequent echos    H/O atrioventricular devante ablation 2017    for afib    Hx of blood clots     PAF (paroxysmal atrial fibrillation) ([facility])     present 15 years, had DCC in 2011 during admission for SBO    Stroke ([facility]) 4/5/2008    air embolism after central line removal; s/p small bowel surgery    Ulcerative colitis ([facility]) 2001    s/p total colectomy      Past Surgical History:   Procedure Laterality Date    CARDIOVERSION ELECTIVE ARRHYTHMIA EXTERNAL  4/27/2018         CARDIOVERSION EXTERNAL  10/17/2011         COLONOSCOPY N/A 1/21/2022    POUCHOSCOPY performed by Erik Delgado MD at [facility] ENDOSCOPY    COLONOSCOPY N/A 05/09/2023    COLONOSCOPY performed by Erik Delgado MD at [facility] ENDOSCOPY    COLONOSCOPY N/A 05/09/2023    COLONOSCOPY WITH BIOPSY performed by Erik Delgado MD at [facility] ENDOSCOPY    COLONOSCOPY N/A 06/27/2023    COLONOSCOPY performed by Erik Delgado MD at [facility] ENDOSCOPY    COLONOSCOPY N/A 06/27/2023    COLONOSCOPY WITH BIOPSY performed by Erik Delgado MD at [facility] ENDOSCOPY    EPHYS EVL TRNSPTL TX ATRIAL FIB ISOLAT PULM

## 2024-10-23 NOTE — DISCHARGE INSTRUCTIONS
today.  As you are feeling well I would not suggest any change in treatment at this time.  We'll plan on pouchoscopy again in a year's time.     It was an honor to be your doctor today.  Please let me or my office staff know if you have any feedback about today's procedure.    Dmitry Delgado MD    Colonoscopy saves lives, and can prevent colon cancer.  Everyone aged 50 or older needs colonoscopy.  Tell your family and friends: get the test!

## 2024-10-23 NOTE — H&P
68 y.o. male for open access pouchoscopy for surveillance of anorectal mucosa that remains after total colectomy which was performed for ulcerative colitis.  Has had problems with pouchitis in the past, last contact 2023 at which time therapy with mesalamine was recommended as antibiotic therapy had not controlled all his symptoms.      Additional data for completion of the targeted pre-endoscopy H&P will be provided under 'H&P interval notes'.  Please see that document which will be attached to this.  Erik Delgado MD

## 2024-10-23 NOTE — OP NOTE
[facility]  [facility]  Erik Delgado MD  [phone]      2024    Pouchoscopy Procedure Note  Sergio De La Cruz  :  1955  [employer] Medical Record Number: [id number]    Indications:    Ulcerative colitis s/p colectomy, with risk of chronic pouchitis as trigger for more frequent surveillance of any residual colonic mucosa.  Currently he reports his pouch symptoms are well controlled using mesalamine MI intermittently.    Regrettably his wife has been diagnosed with inoperable glioblastoma 8 months ago.  He fears her demise in the next several months and I shared with him my best wishes for he and his family.    PCP:  Naren Power DO  Anesthesia/Sedation: Conscious Sedation/Moderate Sedation/GETA, see notes  Endoscopist:  Dr. Erik Delgado  Complications:  None  Estimated Blood Loss:  None    Permit:  The indications, risks, benefits and alternatives were reviewed with the patient or their decision maker who was provided an opportunity to ask questions and all questions were answered.  The specific risks of colonoscopy with conscious sedation were reviewed, including but not limited to anesthetic complication, bleeding, adverse drug reaction, missed lesion, infection, IV site reactions, and intestinal perforation which would lead to the need for surgical repair.  Alternatives to colonoscopy including radiographic imaging, observation without testing, or laboratory testing were reviewed including the limitations of those alternatives.  After considering the options and having all their questions answered, the patient or their decision maker provided both verbal and written consent to proceed.        Procedure in Detail:  After obtaining informed consent, positioning of the patient in the left lateral decubitus position, and conduction of a pre-procedure pause or \"time out\" the endoscope was introduced into the

## 2024-10-25 RX ORDER — FLECAINIDE ACETATE 100 MG/1
100 TABLET ORAL 2 TIMES DAILY
Qty: 60 TABLET | Refills: 5 | Status: SHIPPED | OUTPATIENT
Start: 2024-10-25

## 2024-10-25 NOTE — TELEPHONE ENCOUNTER
Med refilled per VO by MD.    Future Appointments   Date Time Provider Department Center   4/23/2025 10:20 AM Hari Novoa MD [facility]

## 2025-04-23 ENCOUNTER — OFFICE VISIT (OUTPATIENT)
Age: 70
End: 2025-04-23
Payer: MEDICARE

## 2025-04-23 VITALS
WEIGHT: 209 LBS | SYSTOLIC BLOOD PRESSURE: 130 MMHG | HEIGHT: 77 IN | OXYGEN SATURATION: 98 % | BODY MASS INDEX: 24.68 KG/M2 | DIASTOLIC BLOOD PRESSURE: 80 MMHG | HEART RATE: 57 BPM

## 2025-04-23 DIAGNOSIS — I48.0 PAROXYSMAL ATRIAL FIBRILLATION (HCC): Primary | ICD-10-CM

## 2025-04-23 DIAGNOSIS — Z51.81 ENCOUNTER FOR MONITORING FLECAINIDE THERAPY: ICD-10-CM

## 2025-04-23 DIAGNOSIS — Z79.899 ENCOUNTER FOR MONITORING FLECAINIDE THERAPY: ICD-10-CM

## 2025-04-23 DIAGNOSIS — I48.92 PAROXYSMAL ATRIAL FLUTTER (HCC): ICD-10-CM

## 2025-04-23 PROCEDURE — G8427 DOCREV CUR MEDS BY ELIG CLIN: HCPCS | Performed by: INTERNAL MEDICINE

## 2025-04-23 PROCEDURE — 99214 OFFICE O/P EST MOD 30 MIN: CPT | Performed by: INTERNAL MEDICINE

## 2025-04-23 PROCEDURE — 1123F ACP DISCUSS/DSCN MKR DOCD: CPT | Performed by: INTERNAL MEDICINE

## 2025-04-23 PROCEDURE — 1159F MED LIST DOCD IN RCRD: CPT | Performed by: INTERNAL MEDICINE

## 2025-04-23 PROCEDURE — 1160F RVW MEDS BY RX/DR IN RCRD: CPT | Performed by: INTERNAL MEDICINE

## 2025-04-23 PROCEDURE — G8420 CALC BMI NORM PARAMETERS: HCPCS | Performed by: INTERNAL MEDICINE

## 2025-04-23 PROCEDURE — 3017F COLORECTAL CA SCREEN DOC REV: CPT | Performed by: INTERNAL MEDICINE

## 2025-04-23 PROCEDURE — 1036F TOBACCO NON-USER: CPT | Performed by: INTERNAL MEDICINE

## 2025-04-23 NOTE — PROGRESS NOTES
Chief Complaint   Patient presents with    Atrial Fibrillation    Other     FLUTTER     Vitals:    04/23/25 1006   BP: 130/80   BP Site: Left Upper Arm   Patient Position: Sitting   BP Cuff Size: Medium Adult   Pulse: 57   SpO2: 98%   Weight: 94.8 kg (209 lb)   Height: 1.956 m (6' 5\")      /80 (BP Site: Left Upper Arm, Patient Position: Sitting, BP Cuff Size: Medium Adult)   Pulse 57   Ht 1.956 m (6' 5\")   Wt 94.8 kg (209 lb)   SpO2 98%   BMI 24.78 kg/m²

## 2025-04-23 NOTE — PROGRESS NOTES
Cardiac Electrophysiology Office Follow-up     NAME: Sergio De La Cruz   : 1955   MRM: [id number]     Date: 4/10/2024     Primary Cardiologist:     Assessment and Plan:       Diagnosis Orders   1. Paroxysmal atrial fibrillation (HCC)  ECHO COMPLETE ADULT (TTE) W OR WO CONTR- Clinic Performed      2. Paroxysmal atrial flutter (HCC)        3. Encounter for monitoring flecainide therapy            Persistent AF/AFL: S/p ablation 2018, had recurrence shortly thereafter.  Known PAT since  then, but denies known recurrent AF/AFL.  he takes flecainide without AV devante blocker due to bradycardia.  ECG last year was ok  He said PCP did ECG so not to repeat here  He is asymptomatic   Echo 2023 LVEF 59%, mild biatrial enlargment, mild MR and TR, mild aorta enlargement   Will repeat echo next year    Anticoagulation: [id number] 3 (age, CVA/DVT).  DVT occurred post bowel surgery, &  CVA wasn't thromboembolic (air embolism).  Previous hypercoagulable eval negative. Reports not taking Aspirin 81 mg daily. Reviewed risks/benefits of OAC. He prefers to continue on ASA 81 mg po daily for now.   He understands risk of stroke    Coronary calcium score 11% percentile per him, done with PCP  He takes statin and aspirin  No angina symptoms        Hari Novoa M.D.   Electrophysiology/Cardiology   [facility] [place]   [address]                      [address] [address], [address]   [place] [address]                             [facility] [id number] [phone]  =====================================    Subjective:     Sergio De La Cruz, a 69 y.o. who presents for followup of s/p ablation of persistent AF/typical atrial flutter 2018.   No cardiac sx    Echo 2023 LVEF 59%, mild biatrial enlargment, mild MR and TR, mild aorta enlargement      [id number] 3 (age, CVA/DVT).  No OAC, on ASA 81 mg po daily.  CVA was not embolic, was

## 2025-06-17 RX ORDER — FLECAINIDE ACETATE 100 MG/1
100 TABLET ORAL 2 TIMES DAILY
Qty: 60 TABLET | Refills: 5 | Status: SHIPPED | OUTPATIENT
Start: 2025-06-17

## 2025-06-17 NOTE — TELEPHONE ENCOUNTER
VO per NP    Future Appointments   Date Time Provider Department Center   [facility]   5/6/2026  9:20 AM Hari Novoa MD [facility] AM[facility]

## (undated) DEVICE — CONTAINER SPEC 20 ML LID NEUT BUFF FORMALIN 10 % POLYPR STS

## (undated) DEVICE — SIMPLICITY FLUFF UNDERPAD 23X36, MODERATE: Brand: SIMPLICITY

## (undated) DEVICE — SOLIDIFIER MEDC 1200ML -- CONVERT TO 356117

## (undated) DEVICE — Device

## (undated) DEVICE — BAG SPEC BIOHZRD 10 X 10 IN --

## (undated) DEVICE — 1200 GUARD II KIT W/5MM TUBE W/O VAC TUBE: Brand: GUARDIAN

## (undated) DEVICE — FORCEPS BX L240CM JAW DIA2.8MM L CAP W/ NDL MIC MESH TOOTH

## (undated) DEVICE — KIT COLON W/ 1.1OZ LUB AND 2 END

## (undated) DEVICE — INFANT SPO2 SENSOR: Brand: NELLCOR

## (undated) DEVICE — SINGLE USE AIR/WATER, SUCTION AND BIOPSY VALVES SET: Brand: ORCAPOD™

## (undated) DEVICE — SET GRAV CK VLV NEEDLESS ST 3 GANGED 4WAY STPCOCK HI FLO 10

## (undated) DEVICE — CATH IV AUTOGRD BC PNK 20GA 25 -- INSYTE

## (undated) DEVICE — CUFF RMFG BP INF SZ 11L DISP --

## (undated) DEVICE — SENSOR PLSE OXMTR AD CBL L3FT ADH TRANSMISSIVE

## (undated) DEVICE — FORCEP BX 3 2.2 MMX240 CM FOR ENDOSCP RADIAL JAW

## (undated) DEVICE — BAG BELONG PT PERS CLEAR HANDL

## (undated) DEVICE — (D)SENSOR RMFG 02 PULS OXMTR -- DISC BY MFR USE ITEM 133445

## (undated) DEVICE — CANN NASAL O2 CAPNOGRAPHY AD -- FILTERLINE

## (undated) DEVICE — BITEBLOCK ENDOSCP 60FR MAXI WHT POLYETH STURDY W/ VELC WVN

## (undated) DEVICE — BASIN EMSIS 16OZ GRAPHITE PLAS KID SHP MOLD GRAD FOR ORAL

## (undated) DEVICE — CUFF RMFG BP INF SZ 11 DISP -- LAWSON OEM ITEM 238915

## (undated) DEVICE — 3M™ CUROS™ DISINFECTING CAP FOR NEEDLELESS CONNECTORS 270/CARTON 20 CARTONS/CASE CFF1-270: Brand: CUROS™

## (undated) DEVICE — SET ADMIN 16ML TBNG L100IN 2 Y INJ SITE IV PIGGY BK DISP

## (undated) DEVICE — ELECTRODE,RADIOTRANSLUCENT,FOAM,3PK: Brand: MEDLINE

## (undated) DEVICE — CANNULA CUSH AD W/ 14FT TBG